# Patient Record
Sex: MALE | Race: WHITE | HISPANIC OR LATINO | Employment: OTHER | URBAN - METROPOLITAN AREA
[De-identification: names, ages, dates, MRNs, and addresses within clinical notes are randomized per-mention and may not be internally consistent; named-entity substitution may affect disease eponyms.]

---

## 2018-07-14 ENCOUNTER — ANESTHESIA EVENT (OUTPATIENT)
Dept: EMERGENCY MEDICINE | Facility: HOSPITAL | Age: 23
DRG: 917 | End: 2018-07-14
Payer: MEDICAID

## 2018-07-14 ENCOUNTER — ANESTHESIA (OUTPATIENT)
Dept: EMERGENCY MEDICINE | Facility: HOSPITAL | Age: 23
DRG: 917 | End: 2018-07-14
Payer: MEDICAID

## 2018-07-14 ENCOUNTER — HOSPITAL ENCOUNTER (INPATIENT)
Facility: HOSPITAL | Age: 23
LOS: 4 days | Discharge: HOME | DRG: 917 | End: 2018-07-18
Attending: EMERGENCY MEDICINE | Admitting: FAMILY MEDICINE
Payer: MEDICAID

## 2018-07-14 DIAGNOSIS — Z45.2 ENCOUNTER FOR CENTRAL LINE PLACEMENT: ICD-10-CM

## 2018-07-14 DIAGNOSIS — R57.0 CARDIOGENIC SHOCK: ICD-10-CM

## 2018-07-14 DIAGNOSIS — J96.02 ACUTE RESPIRATORY FAILURE WITH HYPOXIA AND HYPERCARBIA: Primary | ICD-10-CM

## 2018-07-14 DIAGNOSIS — F19.10 POLYSUBSTANCE ABUSE: ICD-10-CM

## 2018-07-14 DIAGNOSIS — N17.9 AKI (ACUTE KIDNEY INJURY): ICD-10-CM

## 2018-07-14 DIAGNOSIS — J96.01 ACUTE RESPIRATORY FAILURE WITH HYPOXIA AND HYPERCARBIA: Primary | ICD-10-CM

## 2018-07-14 DIAGNOSIS — J96.92 RESPIRATORY FAILURE WITH HYPOXIA AND HYPERCAPNIA, UNSPECIFIED CHRONICITY: ICD-10-CM

## 2018-07-14 DIAGNOSIS — R57.9 SHOCK: ICD-10-CM

## 2018-07-14 DIAGNOSIS — I95.9 HYPOTENSION, UNSPECIFIED HYPOTENSION TYPE: ICD-10-CM

## 2018-07-14 DIAGNOSIS — J96.91 RESPIRATORY FAILURE WITH HYPOXIA AND HYPERCAPNIA, UNSPECIFIED CHRONICITY: ICD-10-CM

## 2018-07-14 DIAGNOSIS — T50.902D INTENTIONAL DRUG OVERDOSE, SUBSEQUENT ENCOUNTER: ICD-10-CM

## 2018-07-14 DIAGNOSIS — R41.82 ALTERED MENTAL STATUS: ICD-10-CM

## 2018-07-14 DIAGNOSIS — J96.90 RESPIRATORY FAILURE: ICD-10-CM

## 2018-07-14 LAB
ALBUMIN SERPL BCP-MCNC: 2.9 G/DL
ALBUMIN SERPL BCP-MCNC: 4.2 G/DL
ALLENS TEST: ABNORMAL
ALP SERPL-CCNC: 102 U/L
ALP SERPL-CCNC: 61 U/L
ALT SERPL W/O P-5'-P-CCNC: 44 U/L
ALT SERPL W/O P-5'-P-CCNC: 51 U/L
AMMONIA PLAS-SCNC: 70 UMOL/L
AMORPH CRY URNS QL MICRO: ABNORMAL
AMPHET+METHAMPHET UR QL: NEGATIVE
ANION GAP SERPL CALC-SCNC: 17 MMOL/L
ANION GAP SERPL CALC-SCNC: 6 MMOL/L
APAP SERPL-MCNC: <3 UG/ML
APTT BLDCRRT: 27.4 SEC
AST SERPL-CCNC: 72 U/L
AST SERPL-CCNC: 80 U/L
BACTERIA #/AREA URNS HPF: ABNORMAL /HPF
BARBITURATES UR QL SCN>200 NG/ML: NEGATIVE
BASOPHILS # BLD AUTO: 0.01 K/UL
BASOPHILS # BLD AUTO: 0.01 K/UL
BASOPHILS NFR BLD: 0.1 %
BASOPHILS NFR BLD: 0.1 %
BENZODIAZ UR QL SCN>200 NG/ML: NEGATIVE
BILIRUB SERPL-MCNC: 1.1 MG/DL
BILIRUB SERPL-MCNC: 2 MG/DL
BILIRUB UR QL STRIP: NEGATIVE
BUN SERPL-MCNC: 18 MG/DL
BUN SERPL-MCNC: 19 MG/DL
BZE UR QL SCN: NEGATIVE
CALCIUM SERPL-MCNC: 7.5 MG/DL
CALCIUM SERPL-MCNC: 8.6 MG/DL
CANNABINOIDS UR QL SCN: NORMAL
CHLORIDE SERPL-SCNC: 110 MMOL/L
CHLORIDE SERPL-SCNC: 114 MMOL/L
CK SERPL-CCNC: 2314 U/L
CK SERPL-CCNC: 625 U/L
CLARITY UR: CLEAR
CO2 SERPL-SCNC: 18 MMOL/L
CO2 SERPL-SCNC: 22 MMOL/L
COLOR UR: YELLOW
CREAT SERPL-MCNC: 1.7 MG/DL
CREAT SERPL-MCNC: 2.2 MG/DL
CREAT UR-MCNC: 157 MG/DL
DELSYS: ABNORMAL
DIASTOLIC DYSFUNCTION: YES
DIFFERENTIAL METHOD: ABNORMAL
DIFFERENTIAL METHOD: ABNORMAL
EOSINOPHIL # BLD AUTO: 0 K/UL
EOSINOPHIL # BLD AUTO: 0 K/UL
EOSINOPHIL NFR BLD: 0 %
EOSINOPHIL NFR BLD: 0 %
ERYTHROCYTE [DISTWIDTH] IN BLOOD BY AUTOMATED COUNT: 12.9 %
ERYTHROCYTE [DISTWIDTH] IN BLOOD BY AUTOMATED COUNT: 13.1 %
ERYTHROCYTE [SEDIMENTATION RATE] IN BLOOD BY WESTERGREN METHOD: 16 MM/H
ERYTHROCYTE [SEDIMENTATION RATE] IN BLOOD BY WESTERGREN METHOD: 20 MM/H
ERYTHROCYTE [SEDIMENTATION RATE] IN BLOOD BY WESTERGREN METHOD: 20 MM/H
EST. GFR  (AFRICAN AMERICAN): 47 ML/MIN/1.73 M^2
EST. GFR  (AFRICAN AMERICAN): >60 ML/MIN/1.73 M^2
EST. GFR  (NON AFRICAN AMERICAN): 41 ML/MIN/1.73 M^2
EST. GFR  (NON AFRICAN AMERICAN): 56 ML/MIN/1.73 M^2
ESTIMATED AVG GLUCOSE: 100 MG/DL
ESTIMATED PA SYSTOLIC PRESSURE: 33.15
FIO2: 100
FIO2: 100
FIO2: 90
GLUCOSE SERPL-MCNC: 137 MG/DL
GLUCOSE SERPL-MCNC: 64 MG/DL
GLUCOSE UR QL STRIP: ABNORMAL
HBA1C MFR BLD HPLC: 5.1 %
HCO3 UR-SCNC: 20.2 MMOL/L (ref 24–28)
HCO3 UR-SCNC: 20.9 MMOL/L (ref 24–28)
HCO3 UR-SCNC: 21.7 MMOL/L (ref 24–28)
HCT VFR BLD AUTO: 48.2 %
HCT VFR BLD AUTO: 59 %
HGB BLD-MCNC: 15.8 G/DL
HGB BLD-MCNC: 19.4 G/DL
HGB UR QL STRIP: ABNORMAL
HYALINE CASTS #/AREA URNS LPF: 20 /LPF
INR PPP: 1.3
KETONES UR QL STRIP: NEGATIVE
LACTATE SERPL-SCNC: 3.1 MMOL/L
LACTATE SERPL-SCNC: 3.1 MMOL/L
LACTATE SERPL-SCNC: 8 MMOL/L
LEUKOCYTE ESTERASE UR QL STRIP: NEGATIVE
LYMPHOCYTES # BLD AUTO: 1 K/UL
LYMPHOCYTES # BLD AUTO: 1.4 K/UL
LYMPHOCYTES NFR BLD: 13.4 %
LYMPHOCYTES NFR BLD: 8.4 %
MAGNESIUM SERPL-MCNC: 1.5 MG/DL
MAGNESIUM SERPL-MCNC: 2.5 MG/DL
MCH RBC QN AUTO: 30.2 PG
MCH RBC QN AUTO: 30.3 PG
MCHC RBC AUTO-ENTMCNC: 32.8 G/DL
MCHC RBC AUTO-ENTMCNC: 32.9 G/DL
MCV RBC AUTO: 92 FL
MCV RBC AUTO: 93 FL
METHADONE UR QL SCN>300 NG/ML: NEGATIVE
MICROSCOPIC COMMENT: ABNORMAL
MIN VOL: 13
MITRAL VALVE MOBILITY: NORMAL
MODE: ABNORMAL
MONOCYTES # BLD AUTO: 0.7 K/UL
MONOCYTES # BLD AUTO: 1.1 K/UL
MONOCYTES NFR BLD: 6.2 %
MONOCYTES NFR BLD: 9 %
NEUTROPHILS # BLD AUTO: 10 K/UL
NEUTROPHILS # BLD AUTO: 8.5 K/UL
NEUTROPHILS NFR BLD: 80.1 %
NEUTROPHILS NFR BLD: 82 %
NITRITE UR QL STRIP: NEGATIVE
OPIATES UR QL SCN: NORMAL
PCO2 BLDA: 41.9 MMHG (ref 35–45)
PCO2 BLDA: 50.1 MMHG (ref 35–45)
PCO2 BLDA: 53.2 MMHG (ref 35–45)
PCP UR QL SCN>25 NG/ML: NEGATIVE
PEEP: 16
PEEP: 16
PEEP: 5
PH SMN: 7.19 [PH] (ref 7.35–7.45)
PH SMN: 7.23 [PH] (ref 7.35–7.45)
PH SMN: 7.32 [PH] (ref 7.35–7.45)
PH UR STRIP: 6 [PH] (ref 5–8)
PHOSPHATE SERPL-MCNC: 1.8 MG/DL
PIP: 20
PLATELET # BLD AUTO: 188 K/UL
PLATELET # BLD AUTO: 229 K/UL
PMV BLD AUTO: 10.2 FL
PMV BLD AUTO: 10.4 FL
PO2 BLDA: 21 MMHG (ref 40–60)
PO2 BLDA: 51 MMHG (ref 80–100)
PO2 BLDA: 57 MMHG (ref 80–100)
POC BE: -4 MMOL/L
POC BE: -7 MMOL/L
POC BE: -8 MMOL/L
POC SATURATED O2: 24 % (ref 95–100)
POC SATURATED O2: 83 % (ref 95–100)
POC SATURATED O2: 83 % (ref 95–100)
POC TCO2: 22 MMOL/L (ref 23–27)
POC TCO2: 22 MMOL/L (ref 24–29)
POC TCO2: 23 MMOL/L (ref 23–27)
POTASSIUM SERPL-SCNC: 3.8 MMOL/L
POTASSIUM SERPL-SCNC: 4.2 MMOL/L
PROCALCITONIN SERPL IA-MCNC: 12.87 NG/ML
PROT SERPL-MCNC: 4.7 G/DL
PROT SERPL-MCNC: 7.2 G/DL
PROT UR QL STRIP: ABNORMAL
PROTHROMBIN TIME: 13.4 SEC
RBC # BLD AUTO: 5.21 M/UL
RBC # BLD AUTO: 6.42 M/UL
RBC #/AREA URNS HPF: 10 /HPF (ref 0–4)
RETIRED EF AND QEF - SEE NOTES: 25 (ref 55–65)
SALICYLATES SERPL-MCNC: <5 MG/DL
SALICYLATES SERPL-MCNC: <5 MG/DL
SAMPLE: ABNORMAL
SITE: ABNORMAL
SODIUM SERPL-SCNC: 142 MMOL/L
SODIUM SERPL-SCNC: 145 MMOL/L
SP GR UR STRIP: >=1.03 (ref 1–1.03)
SP02: 100
TOXICOLOGY INFORMATION: NORMAL
TRICUSPID VALVE REGURGITATION: ABNORMAL
TROPONIN I SERPL DL<=0.01 NG/ML-MCNC: 0.25 NG/ML
TROPONIN I SERPL DL<=0.01 NG/ML-MCNC: 0.55 NG/ML
TROPONIN I SERPL DL<=0.01 NG/ML-MCNC: 1.41 NG/ML
URN SPEC COLLECT METH UR: ABNORMAL
UROBILINOGEN UR STRIP-ACNC: NEGATIVE EU/DL
VT: 380
VT: 380
VT: 490
WBC # BLD AUTO: 10.56 K/UL
WBC # BLD AUTO: 12.23 K/UL
WBC #/AREA URNS HPF: 5 /HPF (ref 0–5)
YEAST URNS QL MICRO: ABNORMAL

## 2018-07-14 PROCEDURE — 94002 VENT MGMT INPAT INIT DAY: CPT

## 2018-07-14 PROCEDURE — 80053 COMPREHEN METABOLIC PANEL: CPT | Mod: 91

## 2018-07-14 PROCEDURE — 5A1945Z RESPIRATORY VENTILATION, 24-96 CONSECUTIVE HOURS: ICD-10-PCS | Performed by: EMERGENCY MEDICINE

## 2018-07-14 PROCEDURE — 93005 ELECTROCARDIOGRAM TRACING: CPT

## 2018-07-14 PROCEDURE — 96367 TX/PROPH/DG ADDL SEQ IV INF: CPT

## 2018-07-14 PROCEDURE — 82140 ASSAY OF AMMONIA: CPT

## 2018-07-14 PROCEDURE — 36620 INSERTION CATHETER ARTERY: CPT | Mod: ,,, | Performed by: INTERNAL MEDICINE

## 2018-07-14 PROCEDURE — 25000003 PHARM REV CODE 250: Performed by: STUDENT IN AN ORGANIZED HEALTH CARE EDUCATION/TRAINING PROGRAM

## 2018-07-14 PROCEDURE — 0BH17EZ INSERTION OF ENDOTRACHEAL AIRWAY INTO TRACHEA, VIA NATURAL OR ARTIFICIAL OPENING: ICD-10-PCS | Performed by: EMERGENCY MEDICINE

## 2018-07-14 PROCEDURE — 87205 SMEAR GRAM STAIN: CPT

## 2018-07-14 PROCEDURE — 87070 CULTURE OTHR SPECIMN AEROBIC: CPT

## 2018-07-14 PROCEDURE — 25000003 PHARM REV CODE 250: Performed by: FAMILY MEDICINE

## 2018-07-14 PROCEDURE — 84100 ASSAY OF PHOSPHORUS: CPT

## 2018-07-14 PROCEDURE — 36415 COLL VENOUS BLD VENIPUNCTURE: CPT

## 2018-07-14 PROCEDURE — 82550 ASSAY OF CK (CPK): CPT | Mod: 91

## 2018-07-14 PROCEDURE — 80053 COMPREHEN METABOLIC PANEL: CPT

## 2018-07-14 PROCEDURE — 99291 CRITICAL CARE FIRST HOUR: CPT | Mod: 25

## 2018-07-14 PROCEDURE — 85610 PROTHROMBIN TIME: CPT

## 2018-07-14 PROCEDURE — 96365 THER/PROPH/DIAG IV INF INIT: CPT

## 2018-07-14 PROCEDURE — 83735 ASSAY OF MAGNESIUM: CPT | Mod: 91

## 2018-07-14 PROCEDURE — 96361 HYDRATE IV INFUSION ADD-ON: CPT | Mod: 59

## 2018-07-14 PROCEDURE — 37799 UNLISTED PX VASCULAR SURGERY: CPT

## 2018-07-14 PROCEDURE — 93306 TTE W/DOPPLER COMPLETE: CPT

## 2018-07-14 PROCEDURE — 84484 ASSAY OF TROPONIN QUANT: CPT | Mod: 91

## 2018-07-14 PROCEDURE — 82803 BLOOD GASES ANY COMBINATION: CPT

## 2018-07-14 PROCEDURE — C1751 CATH, INF, PER/CENT/MIDLINE: HCPCS | Performed by: EMERGENCY MEDICINE

## 2018-07-14 PROCEDURE — 80307 DRUG TEST PRSMV CHEM ANLYZR: CPT

## 2018-07-14 PROCEDURE — 82550 ASSAY OF CK (CPK): CPT

## 2018-07-14 PROCEDURE — 80329 ANALGESICS NON-OPIOID 1 OR 2: CPT

## 2018-07-14 PROCEDURE — 83605 ASSAY OF LACTIC ACID: CPT

## 2018-07-14 PROCEDURE — 84145 PROCALCITONIN (PCT): CPT

## 2018-07-14 PROCEDURE — 93306 TTE W/DOPPLER COMPLETE: CPT | Mod: 26,,, | Performed by: INTERNAL MEDICINE

## 2018-07-14 PROCEDURE — 25000003 PHARM REV CODE 250: Performed by: EMERGENCY MEDICINE

## 2018-07-14 PROCEDURE — 83605 ASSAY OF LACTIC ACID: CPT | Mod: 91

## 2018-07-14 PROCEDURE — 94761 N-INVAS EAR/PLS OXIMETRY MLT: CPT

## 2018-07-14 PROCEDURE — 63600175 PHARM REV CODE 636 W HCPCS: Performed by: EMERGENCY MEDICINE

## 2018-07-14 PROCEDURE — 87086 URINE CULTURE/COLONY COUNT: CPT

## 2018-07-14 PROCEDURE — 99900035 HC TECH TIME PER 15 MIN (STAT)

## 2018-07-14 PROCEDURE — 96375 TX/PRO/DX INJ NEW DRUG ADDON: CPT

## 2018-07-14 PROCEDURE — 87040 BLOOD CULTURE FOR BACTERIA: CPT

## 2018-07-14 PROCEDURE — 36556 INSERT NON-TUNNEL CV CATH: CPT | Performed by: ANESTHESIOLOGY

## 2018-07-14 PROCEDURE — 31500 INSERT EMERGENCY AIRWAY: CPT | Mod: 59

## 2018-07-14 PROCEDURE — 87106 FUNGI IDENTIFICATION YEAST: CPT

## 2018-07-14 PROCEDURE — 36600 WITHDRAWAL OF ARTERIAL BLOOD: CPT

## 2018-07-14 PROCEDURE — 85025 COMPLETE CBC W/AUTO DIFF WBC: CPT

## 2018-07-14 PROCEDURE — 99233 SBSQ HOSP IP/OBS HIGH 50: CPT | Mod: ,,, | Performed by: INTERNAL MEDICINE

## 2018-07-14 PROCEDURE — 63600175 PHARM REV CODE 636 W HCPCS: Performed by: STUDENT IN AN ORGANIZED HEALTH CARE EDUCATION/TRAINING PROGRAM

## 2018-07-14 PROCEDURE — 83036 HEMOGLOBIN GLYCOSYLATED A1C: CPT

## 2018-07-14 PROCEDURE — 63600175 PHARM REV CODE 636 W HCPCS: Performed by: FAMILY MEDICINE

## 2018-07-14 PROCEDURE — 85730 THROMBOPLASTIN TIME PARTIAL: CPT

## 2018-07-14 PROCEDURE — 83735 ASSAY OF MAGNESIUM: CPT

## 2018-07-14 PROCEDURE — 81000 URINALYSIS NONAUTO W/SCOPE: CPT | Mod: 59

## 2018-07-14 PROCEDURE — 27000221 HC OXYGEN, UP TO 24 HOURS

## 2018-07-14 PROCEDURE — 20000000 HC ICU ROOM

## 2018-07-14 RX ORDER — PROPOFOL 10 MG/ML
5 INJECTION, EMULSION INTRAVENOUS CONTINUOUS
Status: DISCONTINUED | OUTPATIENT
Start: 2018-07-14 | End: 2018-07-16

## 2018-07-14 RX ORDER — IBUPROFEN 200 MG
16 TABLET ORAL
Status: DISCONTINUED | OUTPATIENT
Start: 2018-07-14 | End: 2018-07-18 | Stop reason: HOSPADM

## 2018-07-14 RX ORDER — IBUPROFEN 200 MG
24 TABLET ORAL
Status: DISCONTINUED | OUTPATIENT
Start: 2018-07-14 | End: 2018-07-18 | Stop reason: HOSPADM

## 2018-07-14 RX ORDER — MAGNESIUM SULFATE HEPTAHYDRATE 40 MG/ML
2 INJECTION, SOLUTION INTRAVENOUS ONCE
Status: DISCONTINUED | OUTPATIENT
Start: 2018-07-14 | End: 2018-07-14

## 2018-07-14 RX ORDER — SODIUM CHLORIDE 9 MG/ML
INJECTION, SOLUTION INTRAVENOUS CONTINUOUS
Status: DISCONTINUED | OUTPATIENT
Start: 2018-07-14 | End: 2018-07-14

## 2018-07-14 RX ORDER — PHENYLEPHRINE HYDROCHLORIDE 10 MG/ML
200 INJECTION INTRAVENOUS ONCE
Status: COMPLETED | OUTPATIENT
Start: 2018-07-14 | End: 2018-07-14

## 2018-07-14 RX ORDER — FENTANYL CITRAT/DEXTROSE 5%/PF 100 MCG/10
PATIENT CONTROLLED ANALGESIA SYRINGE INTRAVENOUS CONTINUOUS
Status: DISPENSED | OUTPATIENT
Start: 2018-07-14 | End: 2018-07-15

## 2018-07-14 RX ORDER — SUCCINYLCHOLINE CHLORIDE 20 MG/ML
INJECTION INTRAMUSCULAR; INTRAVENOUS CODE/TRAUMA/SEDATION MEDICATION
Status: COMPLETED | OUTPATIENT
Start: 2018-07-14 | End: 2018-07-14

## 2018-07-14 RX ORDER — DEXTROSE 50 % IN WATER (D50W) INTRAVENOUS SYRINGE
25
Status: DISCONTINUED | OUTPATIENT
Start: 2018-07-14 | End: 2018-07-14

## 2018-07-14 RX ORDER — FENTANYL CITRAT/DEXTROSE 5%/PF 100 MCG/10
PATIENT CONTROLLED ANALGESIA SYRINGE INTRAVENOUS CONTINUOUS
Status: DISCONTINUED | OUTPATIENT
Start: 2018-07-14 | End: 2018-07-14

## 2018-07-14 RX ORDER — SODIUM CHLORIDE 0.9 % (FLUSH) 0.9 %
5 SYRINGE (ML) INJECTION
Status: DISCONTINUED | OUTPATIENT
Start: 2018-07-14 | End: 2018-07-18 | Stop reason: HOSPADM

## 2018-07-14 RX ORDER — GLUCAGON 1 MG
1 KIT INJECTION
Status: DISCONTINUED | OUTPATIENT
Start: 2018-07-14 | End: 2018-07-18 | Stop reason: HOSPADM

## 2018-07-14 RX ORDER — NOREPINEPHRINE BITARTRATE/D5W 16MG/250ML
0.02 PLASTIC BAG, INJECTION (ML) INTRAVENOUS CONTINUOUS
Status: DISCONTINUED | OUTPATIENT
Start: 2018-07-14 | End: 2018-07-17

## 2018-07-14 RX ORDER — ETOMIDATE 2 MG/ML
INJECTION INTRAVENOUS CODE/TRAUMA/SEDATION MEDICATION
Status: COMPLETED | OUTPATIENT
Start: 2018-07-14 | End: 2018-07-14

## 2018-07-14 RX ADMIN — SODIUM CHLORIDE: 0.9 INJECTION, SOLUTION INTRAVENOUS at 10:07

## 2018-07-14 RX ADMIN — PROPOFOL 5 MCG/KG/MIN: 10 INJECTION, EMULSION INTRAVENOUS at 12:07

## 2018-07-14 RX ADMIN — SODIUM CHLORIDE: 0.9 INJECTION, SOLUTION INTRAVENOUS at 01:07

## 2018-07-14 RX ADMIN — MAGNESIUM SULFATE HEPTAHYDRATE: 500 INJECTION, SOLUTION INTRAMUSCULAR; INTRAVENOUS at 04:07

## 2018-07-14 RX ADMIN — CISATRACURIUM BESYLATE 1 MCG/KG/MIN: 10 INJECTION INTRAVENOUS at 05:07

## 2018-07-14 RX ADMIN — MINERAL OIL AND WHITE PETROLATUM: 150; 830 OINTMENT OPHTHALMIC at 09:07

## 2018-07-14 RX ADMIN — PROPOFOL 50 MCG/KG/MIN: 10 INJECTION, EMULSION INTRAVENOUS at 06:07

## 2018-07-14 RX ADMIN — Medication 300 MCG/HR: at 10:07

## 2018-07-14 RX ADMIN — Medication 0.82 MCG/KG/MIN: at 05:07

## 2018-07-14 RX ADMIN — ETOMIDATE 20 MG: 2 INJECTION, SOLUTION INTRAVENOUS at 07:07

## 2018-07-14 RX ADMIN — DEXTROSE MONOHYDRATE 25 G: 500 INJECTION PARENTERAL at 08:07

## 2018-07-14 RX ADMIN — PHENYLEPHRINE HYDROCHLORIDE 200 MCG: 10 INJECTION INTRAVENOUS at 09:07

## 2018-07-14 RX ADMIN — FENTANYL CITRATE: 50 INJECTION, SOLUTION INTRAMUSCULAR; INTRAVENOUS at 08:07

## 2018-07-14 RX ADMIN — MIDAZOLAM 2 MG/HR: 5 INJECTION INTRAMUSCULAR; INTRAVENOUS at 08:07

## 2018-07-14 RX ADMIN — SUCCINYLCHOLINE CHLORIDE 100 MG: 20 INJECTION, SOLUTION INTRAMUSCULAR; INTRAVENOUS at 07:07

## 2018-07-14 RX ADMIN — HYDROCORTISONE SODIUM SUCCINATE 100 MG: 100 INJECTION, POWDER, FOR SOLUTION INTRAMUSCULAR; INTRAVENOUS at 09:07

## 2018-07-14 RX ADMIN — POTASSIUM PHOSPHATE, MONOBASIC AND POTASSIUM PHOSPHATE, DIBASIC 20 MMOL: 224; 236 INJECTION, SOLUTION, CONCENTRATE INTRAVENOUS at 04:07

## 2018-07-14 RX ADMIN — VASOPRESSIN 0.04 UNITS/MIN: 20 INJECTION INTRAVENOUS at 12:07

## 2018-07-14 RX ADMIN — SODIUM CHLORIDE, SODIUM LACTATE, POTASSIUM CHLORIDE, AND CALCIUM CHLORIDE 1000 ML: .6; .31; .03; .02 INJECTION, SOLUTION INTRAVENOUS at 10:07

## 2018-07-14 RX ADMIN — Medication 0.8 MCG/KG/MIN: at 11:07

## 2018-07-14 RX ADMIN — PIPERACILLIN AND TAZOBACTAM 4.5 G: 4; .5 INJECTION, POWDER, LYOPHILIZED, FOR SOLUTION INTRAVENOUS; PARENTERAL at 08:07

## 2018-07-14 RX ADMIN — Medication 0.02 MCG/KG/MIN: at 10:07

## 2018-07-14 RX ADMIN — VANCOMYCIN HYDROCHLORIDE 2000 MG: 10 INJECTION, POWDER, LYOPHILIZED, FOR SOLUTION INTRAVENOUS at 10:07

## 2018-07-14 RX ADMIN — VASOPRESSIN 0.04 UNITS/MIN: 20 INJECTION INTRAVENOUS at 05:07

## 2018-07-14 RX ADMIN — SODIUM CHLORIDE 1000 ML: 0.9 INJECTION, SOLUTION INTRAVENOUS at 08:07

## 2018-07-14 RX ADMIN — SODIUM CHLORIDE 1000 ML: 0.9 INJECTION, SOLUTION INTRAVENOUS at 07:07

## 2018-07-14 NOTE — ED NOTES
Javi Retana MD at bedside for [facility], supervisor Dr Yao.  Emergency consent signed by Dr Yao and Dr Lindsey

## 2018-07-14 NOTE — CONSULTS
[facility]               Consult H&P    Reason for Consult:        Assessment:    1. Shock, Cardiogenic  2. NSTEMI demand perfusion mismatch  3. Respiratory Failure- on ventilator      Plan:    Continue Levophed  Tachycardia secondary to hypotension and levo  Cardiomyopathy stress induce. Will require bb and acei when more stable.        Cyn Lord MD  Cardiology Service      HPI: 24 y/o male with no significant PMH present non responsive. Unable to get history as patient is intubated to protect his airways. As per medical records patient snort ectasy and inhale a cleaning substance. He had tachycardia on presentation with hypotension. Cardiology consulted as troponin is elevated. Echo reviewed at bedside showed ef 25% with elevated right side pressures.       Review of Systems -Unable to Obtain    No past surgical history on file.    No past medical history on file.     reports that he has been smoking.  He has never used smokeless tobacco. He reports that he uses drugs, including Marijuana.     No family history on file.       Review of patient's allergies indicates:   Allergen Reactions    Penicillins Rash       There are no discharge medications for this patient.       dextrose 50 % in water (D50W)  25 g Intravenous ED 1 Time        sodium chloride 0.9%      fentanyl      midazolam (VERSED) infusion (non-titrating) 2 mg/hr (07/14/18 0808)    norepinephrine bitartrate-D5W 0.12 mcg/kg/min (07/14/18 1113)    propofol      vasopressin (PITRESSIN) infusion         dextrose 50%, dextrose 50%, dextrose 50%, glucagon (human recombinant), glucose, glucose, sodium chloride 0.9%    Vitals:    07/14/18 1041 07/14/18 1046 07/14/18 1101 07/14/18 1134   BP: (!) 74/45 (!) 75/40 (!) 78/46    BP Location:   Left arm    Patient Position:   Lying    Pulse: 110 110 103    Resp: (!) 22 (!) 23 (!) 23    SpO2: (!) 90% (!) 89% 95% (!) 70%   Weight:       Height:             Physical  Examination:  General Appearance: Intubated  Mental: Intubated and Ventilated  HEENT: Perrl  Chest: No pain with palpitations, no chest deformities  Heart: tachycardia, no murmurs, no gallops or rubs  Lung: CTAB  ABDOMEN: Soft, nontender, nondistended with good bowel sounds heard. No mass or hepatosplenomegaly  EXTREMITIES: Without cyanosis, clubbing or edema.   NEUROLOGICAL: Gross nonfocal. Skin: Warm and dry without any rash. There is no costovertebral angle tenderness.   Skin: no rashes lesions or ulcers      Lab Results   Component Value Date     07/14/2018    K 3.8 07/14/2018     07/14/2018    CO2 18 (L) 07/14/2018    BUN 18 07/14/2018    CREATININE 2.2 (H) 07/14/2018    GLU 64 (L) 07/14/2018    MG 2.5 07/14/2018    AST 72 (H) 07/14/2018    ALT 51 (H) 07/14/2018    ALBUMIN 4.2 07/14/2018    PROT 7.2 07/14/2018    BILITOT 1.1 (H) 07/14/2018    WBC 10.56 07/14/2018    HGB 15.8 07/14/2018    HCT 48.2 07/14/2018    MCV 93 07/14/2018     07/14/2018         Recent Labs  Lab 07/14/18  0738   *   TROPONINI 0.249*         Recent Labs  Lab 07/14/18  0738   *   TROPONINI 0.249*       No results found for: TSH    No results found for: HGBA1C      Images and labs reviewed    ECG: Sinus tachycardia

## 2018-07-14 NOTE — ED TRIAGE NOTES
Pt arrived via  EMS from home in [place].  Pt found unresponsive, moaning, foaming at the mouth.  Family reports pt snorted ecstacy for the fist time last per friends.  Mother initially heard him making moaning sounds around 0300 but didn't think anything of it because GF was in room with him.  EMS administered 1mg of Narcan with minimal response, NPA placed in field, bagged upon arrival.  Pt posturing upon arrival severely tachypneic unable to protect own airway.

## 2018-07-14 NOTE — LETTER
July 15, 2018                         [address] [place] [address] [place]a SILVA [address]  Phone: [phone]  Fax: [phone]   July 15, 2018     Patient:    Date of Birth:    Date of Visit: 7/15/2018       To Whom it May Concern:    Katie Botello was visiting a patient in [facility] ICU on [place] 7/15.      Hailey Miranda RN

## 2018-07-14 NOTE — H&P
[facility]  History & Physical     Subjective:       Chief Complaint/Reason for Admission: Drug Overdose    History of Present Illness:   Patient is a 23 y.o. male admitted after a toxic ingestion. The patient was intubated upon exam and unable to provide any information.  Per review of medical record and EMS, the patient has no significant pmhx, medications, or allergies.   Drugs thought to be ingested include: possible ecstasy and urine tox screen was positive for opioids and THC. Approximately last seen at 12 am without being altered. In the ED, the patient was treated with Narcan with little improvement. Upon presentation in the ED, the patient was unresponsive and unable to protect his airway; therefore, the patient was intubated.      Review of Systems:  Unable to perform secondary to the patient clinical condition. The patient was intubated during the encounter.     Patient Active Problem List    Diagnosis Date Noted    Respiratory failure 07/14/2018     No past medical history on file.   No past surgical history on file.   No prescriptions prior to admission.     Review of patient's allergies indicates:   Allergen Reactions    Penicillins Rash      Social History   Substance Use Topics    Smoking status: Current Every Day Smoker    Smokeless tobacco: Never Used    Alcohol use Not on file      No family history on file.       OBJECTIVE:     Vital signs in last 24 hours:  Pulse:  [] 103  Resp:  [19-38] 23  SpO2:  [70 %-100 %] 70 %  BP: ()/() 78/46    Constitutional: He appears well-developed.    General: The patient is intubated.  HENT:   Neck: supple w/o JVD  Head: Normocephalic and atraumatic.   Eyes: Pupils were symmetric and approximately 6 mm in size.   Neck: No traumatic lesions noted.   Cardiovascular: Sinus rhythm.  Exam reveals no gallop, murmurs, and no friction rub. +Tachycardia.  Pulmonary/Chest: Mechanical breath sounds.  Abdominal: Soft. He exhibits no  distension.    Musculoskeletal: unable to access adequately secondary to his current clinical condition.   Neurological: Sedated  Skin: Skin is warm and dry. No rashes noted.     Data Review:   CBC:   Lab Results   Component Value Date    WBC 12.23 07/14/2018    RBC 6.42 (H) 07/14/2018    HGB 19.4 (H) 07/14/2018    HCT 59.0 (H) 07/14/2018     07/14/2018     BMP:   Lab Results   Component Value Date    GLU 64 (L) 07/14/2018     07/14/2018    K 3.8 07/14/2018     07/14/2018    CO2 18 (L) 07/14/2018    BUN 18 07/14/2018    CREATININE 2.2 (H) 07/14/2018    CALCIUM 8.6 (L) 07/14/2018     Coagulation: No results found for: PT, INR, APTT  Cardiac markers: No results found for: CKMB, TROPONINT, MYOGLOBIN  ABGs:   Lab Results   Component Value Date    PH 7.229 (LL) 07/14/2018    PO2 57 (LL) 07/14/2018    PCO2 50.1 (H) 07/14/2018     Radiology review:   X-Ray Chest 1 View  New right internal jugular central venous catheter tip overlies the SVC.  No pneumothorax.    CT Head Without Contrast  No acute abnormality.    X-Ray Chest AP Portable  Diffuse hazy airspace disease    ASSESSMENT/PLAN:     Active Hospital Problems    Diagnosis  POA    Respiratory failure [J96.90]  Yes      Resolved Hospital Problems    Diagnosis Date Resolved POA   No resolved problems to display.     Mr. Eliu Herrera w/ no significant PMHx presented to the ED in respiratory failure secondary to possible drug overdose.    1. Respiratory failure  - Intubated in the ED  - Consulted Pulmonary/Critical Care and appreciate recommendations  - Cardiology Consulted and appreciate recommendations  - Continue to monitor ABG's  - F/u blood cxs/ urine cxs, placed on broad spectrum abxs: Vanco and Zosyn  - Neuro checks q 4 hours  - Continue Pulse Ox    2. Hypotension likely secondary to Cardiogenic Shock  - Landers in place to monitor I/O, Landers to gravity  - Currently on Norepi (Pressor)  - Continue IVF's   - Continue telemetry  - Keep MAP >65 mm  Hg  - Avoid renal toxic meds  - Keep Mg 2, K 4  - LA 8  - Per Cardiology, will require a BB and ACEI when more stable     NEURO-PSYCH:     -Drug panel positive for opioids and THC      CV     -Maintain MAP>65   -Currently on Norepi     PULM:     -Vanco and Zosyn started.   -Monitor Pulse Ox q 4   -Intubated and Sedated     RENAL:     -IVFs   -Strict I&Os  -Avoid renal toxic meds    Endo:  -SSI    FEN-GI:     -NPO  -IV fluids    HEME:      -No acute issues at this time     ID:  -Vanc and zosyn started   -Urine culture and UA ordered  -Blood culture ordered  -Urine culture ordered  -Procalc  12.87    PPX  -SCD    Dispo:  - Admit to ICU  - Full Code status  - Follow up on both Cardiology and Pulmonary/Critical Care recommendations  - Continue supportive and pain management  - Case discussed with the team

## 2018-07-14 NOTE — CONSULTS
Pulmonary & Critical Care Medicine   Consultation Note    Reason for Consultation: Vent management, hypoxia    HPI: Mr. Herrera is a 24 yo  male who presented to [facility] on 7/14 early in the morning for altered mental status at home. Mother and sister state that patient went to bed around 2 am and was heard moaning at 6 am this morning. Mother went into the room and the patient was not feeling well and was confused. Known marijuana use, however, no other known drug use. Patient is very involved with his family and is not known to do any other drugs except marijuana. Apparently, patient's girlfriend stated that he tried ecstasy for the first time last night. Utox was (+) for opiates and marijuana. Per family, no recent sick contacts, no recent illnesses, no recent complaints of any sort.    In the ED, patient received 2L IVF and antibiotics. Central line was placed. Levophed was started.    Since arriving to the ICU, 1 more L of fluid was administered. Arterial line was placed. Vasopressin was added to Levophed. Hydrocortisone 100 mg q8h was started. Patient has been very difficult to oxygenate - now in severe ARDS. PEEP at 16 and FiO2 of 100% to maintain sats around 88%. Bedside ECHO showed dilated IVC - however, patient is on high PEEP. LV function seemed diminished. Waiting on official ECHO report.    No past medical history on file.     No past surgical history on file.     Social History:   Social History     Social History    Marital status: Single     Spouse name: N/A    Number of children: N/A    Years of education: N/A     Occupational History    Not on file.     Social History Main Topics    Smoking status: Current Every Day Smoker    Smokeless tobacco: Never Used    Alcohol use Not on file    Drug use: Yes     Types: Marijuana    Sexual activity: Not on file     Other Topics Concern    Not on file     Social History Narrative    No narrative on file     No family history on  file.  Drug Allergies:   Review of patient's allergies indicates:   Allergen Reactions    Penicillins Rash     Current Infusions:   sodium chloride 0.9%      fentanyl      midazolam (VERSED) infusion (non-titrating) 2 mg/hr (07/14/18 0808)    norepinephrine bitartrate-D5W 0.12 mcg/kg/min (07/14/18 1113)    propofol      vasopressin (PITRESSIN) infusion       Scheduled Medications:     dextrose 50 % in water (D50W)  25 g Intravenous ED 1 Time     PRN Medications:   dextrose 50%, dextrose 50%, dextrose 50%, glucagon (human recombinant), glucose, glucose, sodium chloride 0.9%    Review of Systems:   A comprehensive 12-point review of systems was performed, and is negative except for those items mentioned above in the HPI section of this note.     Vital Signs:    Vitals:    07/14/18 1101   BP: (!) 78/46   Pulse: 103   Resp: (!) 23     Fluid Balance:     Intake/Output Summary (Last 24 hours) at 07/14/18 1224  Last data filed at 07/14/18 0919   Gross per 24 hour   Intake             2100 ml   Output                0 ml   Net             2100 ml     Physical Exam:   General: NAD, sedated  HEENT: AT/NC, PERRL, nasal and oral mucosa dry. Normal dentition. Oropharynx without exudate.   Neck: Supple without JVD. Trachea midline. Thyroid feels normal.  Cardiac: S1S2 auscultated, RRR with no MRG with brisk cap refill and symmetric pulses in distal extremities.  Respiratory: Normal inspection. Symmetric chest rise. Auscultation clear bilaterally. No increased work of breathing noted.   Abdomen: Soft, NT/ND. +BS. No palpable masses. No hepatosplenomegaly.   Extremities: No visible atrophy. No clubbing or cyanosis on nail exam. No edema.  Skin: Warm and dry without visible rash.  Neuro: sedated    Personal Review and Summary of Prior Diagnostics    Laboratory Studies:     Recent Labs  Lab 07/14/18  0835   PH 7.229*   PCO2 50.1*   PO2 57*   HCO3 20.9*   POCSATURATED 83*   BE -7       Recent Labs  Lab 07/14/18  1154   WBC  10.56   RBC 5.21   HGB 15.8   HCT 48.2      MCV 93   MCH 30.3   MCHC 32.8       Recent Labs  Lab 07/14/18  0738      K 3.8      CO2 18*   BUN 18   CREATININE 2.2*   MG 2.5     Microbiology Data:   Microbiology Results (last 7 days)     Procedure Component Value Units Date/Time    Blood culture x two cultures. Draw prior to antibiotics. [[id number]] Collected:  07/14/18 0738    Order Status:  Sent Specimen:  Blood from Peripheral, Forearm, Right Updated:  07/14/18 0914    Urine culture - High Risk [[id number]] Collected:  07/14/18 0757    Order Status:  Sent Specimen:  Urine from Urine, Catheterized Updated:  07/14/18 0911    Blood culture x two cultures. Draw prior to antibiotics. [[id number]] Collected:  07/14/18 0847    Order Status:  Sent Specimen:  Blood from Peripheral, Forearm, Left Updated:  07/14/18 0849        Summary of Chest Imaging Personally Reviewed:   Bilateral airspace disease    2D Echo: Pending results    PFT's: None on file    Impression & Recommendations    Neuro:  -Sedated with Fentanyl and Propofol at this time.  -Will be paralyzed with Nimbex.    Pulm:  -ARDS  -7.323/42/51/21/83% on 380/30/16/100%  -Nimbex will be started once sedation is adequate.  -Maintain O2 sats >88% or paO2 >55.  -Permissive hypercapnia is fine.    Cardio:  -Shock likely 2/2 to sepsis? Cardiomyopathy 2/2 to drug effects?  -On Levophed and Vasopressin  -Hydrocortisone 100 mg IV q8h started    Renal:  -BUN/Cr improved with 3L fluid resuscitation.  -GFR now 56  -Concern for ATN in the near future given shock.  -CK 2314 at this time.  -Recheck CK tomorrow morning.  -Do not start maintenance fluid.    ID:  -Afebrile, WBC 10 with neutrophilia  -Lactate 8.0 --> 3.1  -Procal 12.87 (in the setting of renal dysfunction)  -UA clean  -Blood cultures obtained.  -Respiratory culture pending.  -Receiving Vanc and Zosyn    Thank you for involving us in the care of this patient. We will continue to follow along. Please  call with any questions.    Jennifer Ba MD  [facility] Center[facility]imm[facility]wood[facility] Fellow, PGY 5  [facility]  Pager: [phone]

## 2018-07-14 NOTE — ANESTHESIA PROCEDURE NOTES
Central Line    Diagnosis: Hypotension  Patient location during procedure: ED  Procedure start time: 7/14/2018 9:37 AM  Timeout: 7/14/2018 9:37 AM  Procedure end time: 7/14/2018 9:55 AM  Staffing  Anesthesiologist: SARAH HUTTON  Resident/CRNA: SAMIR NATH  Performed: resident/CRNA and anesthesiologist   Anesthesiologist was present at the time of the procedure.  Preanesthetic Checklist  Completed: patient identified, site marked, surgical consent, pre-op evaluation, timeout performed, IV checked, risks and benefits discussed, monitors and equipment checked and anesthesia consent given  Indication  Indication: vascular access, med administration     Anesthesia   Anesthesia method: deferred given mental status.    Central Line  Skin Prep: skin prepped with ChloraPrep, skin prep agent completely dried prior to procedure  maximum sterile barriers used during central venous catheter insertion  hand hygiene performed prior to central venous catheter insertion  Location: right internal jugular,   Catheter type: triple lumen  Catheter Size: 7 Fr  Inserted Catheter Length: 16 cm  Ultrasound: vascular probe with ultrasound  Vessel Caliber: medium, patent  Vascular Doppler:  not done, compressibility normal  Needle advanced into vessel with real time Ultrasound guidance.  Sterile sheath used.  Image recorded and saved.  Manometry: none  Insertion Attempts: 1   Securement:line sutured, chlorhexidine patch, sterile dressing applied and blood return through all ports     Post-Procedure  Post-procedure assessment: pending.  Adverse Events:none

## 2018-07-14 NOTE — ED PROVIDER NOTES
Encounter Date: 7/14/2018    SCRIBE #1 NOTE: I, Cherise Montgomery, am scribing for, and in the presence of,  Dr. Lindsey. I have scribed the entire note.       History     Chief Complaint   Patient presents with    Drug Overdose     Time seen by the provider: 7:20 AM    This is a 23 y.o. male who has no past medical history on file.   The patient presents to the Emergency Department via EMS s/p possible OD PTA.   Per EMS, his last known well time was about 7 hours ago. Family reports they found him at home unresponsive and grunting.   EMS administered 1 mg Narcan IV, and he became more responsive, but only with BUE flexor response.   On arrival to ER, the pt was unresponsive and unable to contribute to history.    Per EMS, he has no history, medications, or allergies.  Pt has no past surgical history on file.        The history is provided by the EMS personnel. The history is limited by the condition of the patient.     Review of patient's allergies indicates:  Allergies not on file  No past medical history on file.  No past surgical history on file.  No family history on file.  Social History   Substance Use Topics    Smoking status: Current Every Day Smoker    Smokeless tobacco: Never Used    Alcohol use Not on file     Review of Systems   Unable to perform ROS: Patient unresponsive       Physical Exam     Initial Vitals   BP Pulse Resp Temp SpO2   07/14/18 0735 07/14/18 0729 07/14/18 0729 07/14/18 1545 07/14/18 0729   121/62 (!) 180 (!) 38 98.7 °F (37.1 °C) 97 %      MAP       --                Physical Exam    Nursing note and vitals reviewed.  Constitutional: He appears well-developed and well-nourished. He is diaphoretic. He appears ill.   Unresponsive. Not following commands.   HENT:   Head: Normocephalic and atraumatic.   Nose: Nose normal.   Mouth/Throat: No oropharyngeal exudate.   Eyes: Conjunctivae and lids are normal. Pupils are equal, round, and reactive to light.   Pupils 5 mm and symmetric.   Neck:  Normal range of motion. No JVD present.   Cardiovascular: Regular rhythm. Tachycardia present.  Exam reveals no gallop and no friction rub.    No murmur heard.  HR 160s   Pulmonary/Chest: Tachypnea noted. He is in respiratory distress.   Abdominal: Soft. He exhibits no distension. There is no tenderness.   Musculoskeletal: Normal range of motion. He exhibits no edema.   No obvious deformity.   Neurological: He is unresponsive. He exhibits abnormal muscle tone. GCS eye subscore is 1. GCS verbal subscore is 2. GCS motor subscore is 3.   Bilateral upper extremity contractures.  Unable to obtain full neuro exam.   Skin: Skin is warm. No rash noted. No erythema.         ED Course   Intubation  Date/Time: 7/14/2018 7:35 AM  Location procedure was performed: [facility] EMERGENCY DEPARTMENT  Performed by: VIVEK ARRIAGA  Authorized by: VIVEK ARRIAGA   Consent Done: Emergent Situation  Indications: respiratory failure and  airway protection  Intubation method: direct  Patient status: paralyzed (RSI)  Preoxygenation: BVM  Sedatives: etomidate  Paralytic: succinylcholine  Laryngoscope size: Mac 4  Tube size: 8.0 mm  Tube type: cuffed  Number of attempts: 1  Cricoid pressure: yes  Cords visualized: yes  Post-procedure assessment: chest rise and CO2 detector  Breath sounds: equal  Cuff inflated: yes  Tube secured with: ETT murillo  Chest x-ray findings: endotracheal tube in appropriate position  Patient tolerance: Patient tolerated the procedure well with no immediate complications  Complications: No        Labs Reviewed   CBC W/ AUTO DIFFERENTIAL - Abnormal; Notable for the following:        Result Value    RBC 6.42 (*)     Hemoglobin 19.4 (*)     Hematocrit 59.0 (*)     Gran # (ANC) 10.0 (*)     Mono # 1.1 (*)     Gran% 82.0 (*)     Lymph% 8.4 (*)     All other components within normal limits   COMPREHENSIVE METABOLIC PANEL - Abnormal; Notable for the following:     CO2 18 (*)     Glucose 64 (*)     Creatinine 2.2 (*)     Calcium  8.6 (*)     Total Bilirubin 1.1 (*)     AST 72 (*)     ALT 51 (*)     Anion Gap 17 (*)     eGFR if  47 (*)     eGFR if non  41 (*)     All other components within normal limits   LACTIC ACID, PLASMA - Abnormal; Notable for the following:     Lactate (Lactic Acid) 8.0 (*)     All other components within normal limits    Narrative:        LA critical result(s) called and verbal readback obtained from   KELVIN Snyder, 07/14/2018 08:27   URINALYSIS, REFLEX TO URINE CULTURE - Abnormal; Notable for the following:     Specific Gravity, UA >=1.030 (*)     Protein, UA 1+ (*)     Glucose, UA 3+ (*)     Occult Blood UA 3+ (*)     All other components within normal limits    Narrative:     Preferred Collection Type->Urine, Clean Catch   TROPONIN I - Abnormal; Notable for the following:     Troponin I 0.249 (*)     All other components within normal limits   PROCALCITONIN - Abnormal; Notable for the following:     Procalcitonin 12.87 (*)     All other components within normal limits   ACETAMINOPHEN LEVEL - Abnormal; Notable for the following:     Acetaminophen (Tylenol), Serum <3.0 (*)     All other components within normal limits   SALICYLATE LEVEL - Abnormal; Notable for the following:     Salicylate Lvl <5.0 (*)     All other components within normal limits   AMMONIA - Abnormal; Notable for the following:     Ammonia 70 (*)     All other components within normal limits   CK - Abnormal; Notable for the following:      (*)     All other components within normal limits   URINALYSIS MICROSCOPIC - Abnormal; Notable for the following:     RBC, UA 10 (*)     Hyaline Casts, UA 20 (*)     All other components within normal limits    Narrative:     Preferred Collection Type->Urine, Clean Catch   ISTAT PROCEDURE - Abnormal; Notable for the following:     POC PH 7.229 (*)     POC PCO2 50.1 (*)     POC PO2 57 (*)     POC HCO3 20.9 (*)     POC SATURATED O2 83 (*)     POC TCO2 22 (*)     All other  components within normal limits   CULTURE, URINE   MAGNESIUM   DRUG SCREEN PANEL, URINE EMERGENCY    Narrative:     Preferred Collection Type->Urine, Clean Catch   CK     EKG Readings: (Independently Interpreted)   8:59 AM: Sinus tachycardia. Rate 121. Nonspecifc ST changes. No ischemia. Normal intervals. No prior EKG for comparison.       Imaging Results          X-Ray Chest 1 View (Final result)  Result time 07/14/18 10:10:41    Final result by Jg Gallegos MD (07/14/18 10:10:41)                 Impression:      New right internal jugular central venous catheter tip overlies the SVC.  No pneumothorax.    Unchanged to slightly worsened diffuse airspace disease.      Electronically signed by: Jg Gallegos MD  Date:    07/14/2018  Time:    10:10             Narrative:    EXAMINATION:  XR CHEST 1 VIEW    CLINICAL HISTORY:  Encounter for adjustment and management of vascular access device    TECHNIQUE:  Single frontal portable view of the chest was performed.    COMPARISON:  Chest radiograph same date 7:43 a.m.    FINDINGS:  Support devices: Defibrillator pad overlies the mid chest with another overlying the left inferior hemithorax.  Right internal jugular central venous catheter tip overlies the SVC.  Endotracheal tube tip overlies the mid to lower thoracic trachea.    Diffuse patchy and hazy airspace opacification throughout the lungs, worse on the left is unchanged to minimally worsened from examination earlier same date.  No pleural effusion or pneumothorax.    The cardiac silhouette is normal in size. The hilar and mediastinal contours are unremarkable.    Bones are intact.                               CT Head Without Contrast (Final result)  Result time 07/14/18 09:31:56    Final result by Jg Gallegos MD (07/14/18 09:31:56)                 Impression:      No acute abnormality.      Electronically signed by: Jg Gallegos MD  Date:    07/14/2018  Time:    09:31             Narrative:    EXAMINATION:  CT HEAD  WITHOUT CONTRAST    CLINICAL HISTORY:  altered mental status;    TECHNIQUE:  Low dose axial CT images obtained throughout the head without intravenous contrast. Sagittal and coronal reconstructions were performed.    COMPARISON:  None.    FINDINGS:  Intracranial compartment:    The brain parenchyma appears normal. No parenchymal mass, hemorrhage, edema or major vascular distribution infarct.    Ventricles and sulci are normal in size for age without evidence of hydrocephalus.    No extra-axial blood or fluid collections.    Skull/extracranial contents (limited evaluation): No fracture. Mastoid air cells and paranasal sinuses are essentially clear.  Endotracheal tube is partially imaged in the oral cavity.                               X-Ray Chest AP Portable (Final result)  Result time 07/14/18 08:11:21    Final result by Jg Gallegos MD (07/14/18 08:11:21)                 Impression:      Diffuse hazy airspace disease, in keeping with ARDS given clinical setting of sepsis.      Electronically signed by: Jg Gallegos MD  Date:    07/14/2018  Time:    08:11             Narrative:    EXAMINATION:  XR CHEST AP PORTABLE    CLINICAL HISTORY:  Sepsis;    TECHNIQUE:  Single frontal portable view of the chest was performed.    COMPARISON:  None    FINDINGS:  Support devices: Endotracheal tube tip is at the level of the lower thoracic trachea, 1.5 cm above the nicole.    Diffuse patchy and hazy airspace opacification is present throughout the lungs, greater on the left than on the right.  No pleural effusion or pneumothorax.    The cardiac silhouette is normal in size. The hilar and mediastinal contours are unremarkable.    Bones are intact.                                 Medical Decision Making:   Initial Assessment:   24 y/o male found down at home, EMS gave 1g Narcan with minimal improvement en route.   On arrival, he is tachypnic, tachycardic, unresponsive.   Will intubate for airway protection, order labs, sepsis  evaluation, EKG, imaging, and anticipate ICU admission for further monitoring.  Differential Diagnosis:   Differential Diagnosis includes, but is not limited to:  CVA/TIA, seizure, status epilepticus, post-ictal state, meningitis/encephalitis, sepsis, MI/ACS, arrhythmia, syncope, intracranial mass/hemorrhage, head trauma, anaphylaxis, substance abuse, alcohol intoxication/withdrawal, medication reaction, intentional medication overdose, neuroleptic malignant syndrome, serotonin syndrome, CO poisoning, hypoxia/hypercapnea, hepatic encephalopathy, metabolic disturbance, thyroid disease, hypoglycemia.    Clinical Tests:   Lab Tests: Ordered and Reviewed  Radiological Study: Ordered and Reviewed  Medical Tests: Ordered and Reviewed  ED Management:  EKG sinus tach.  CT head without acute process.  CXR with pulmonary edema versus aspiration versus ARDS.  Patient covered for sepsis with IV fluids and broad-spectrum antibiotics.  On reassessment, patient now profoundly hypotensive.  CVL placed and Levophed started.  Troponin elevated to 0.2, bedside ultrasound concerning for low EF.  Discussed case with Dr. Johnston who recommended BP control and STAT echo.  Discussed patient with [facility] who will admit.  Other:   I have discussed this case with another health care provider.  Upon re-evaluation, the patient's status has remained critical.  At this time, I believe the patient should be admitted to the hospital for further evaluation and management of respiratory failure, FLAKITO, pulmonary edema.    [facility] service was contacted and the case was discussed. The consulting physician agrees with plan and will admit under their service. Additional recommendations at this time: none    The patient and family were updated with test results, overall impression, and further plan of care. All questions were answered. The patient expressed understanding and agreed with the current plan.                  Attending  Attestation:         Attending Critical Care:   Critical Care Times:   Direct Patient Care (initial evaluation, reassessments, and time considering the case)................................................................30 minutes.   Additional History from reviewing old medical records or taking additional history from the family, EMS, PCP, etc.......................15 minutes.   Ordering, Reviewing, and Interpreting Diagnostic Studies...............................................................................................................20 minutes.   Documentation..................................................................................................................................................................................10 minutes.   Consultation with other Physicians. .................................................................................................................................................10 minutes.   Consultation with the patient's family directly relating to the patient's condition, care, and DNR status (when patient unable)......10 minutes.   ==============================================================  · Total Critical Care Time - exclusive of procedural time: 95 minutes.  ==============================================================  Critical care was necessary to treat or prevent imminent or life-threatening deterioration of the following conditions: respiratory failure and overdose.   The following critical care procedures were done by me (see procedure notes): pulse oximetry and endotracheal tube placement *.   Critical care was time spent personally by me on the following activities: obtaining history from patient or relative, examination of patient, review of old charts, ordering lab, x-rays, and/or EKG, development of treatment plan with patient or relative, ordering and performing treatments and interventions, evaluation of patient's response to  treatment, discussion with consultants, interpretation of cardiac measurements and re-evaluation of patient's conition.   Critical Care Condition: life-threatening                  Clinical Impression:     1. Altered mental status    2. Encounter for central line placement    3. Intentional drug overdose, subsequent encounter    4. FLAKITO (acute kidney injury)    5. Polysubstance abuse    6. Respiratory failure with hypoxia and hypercapnia, unspecified chronicity    7. Hypotension, unspecified hypotension type    8. Respiratory failure            Disposition:   Disposition: Admitted    I, Dr. Zelalem Lindsey, personally performed the services described in this documentation. All medical record entries made by the scribe were at my direction and in my presence.  I have reviewed the chart and agree that the record reflects my personal performance and is accurate and complete.     Zelalem Lindsey MD.  5:19 PM 07/14/2018                     Zelalem Lindsey MD  07/14/18 7940

## 2018-07-15 LAB
ALBUMIN SERPL BCP-MCNC: 3.2 G/DL
ALLENS TEST: ABNORMAL
ALP SERPL-CCNC: 65 U/L
ALT SERPL W/O P-5'-P-CCNC: 57 U/L
ANION GAP SERPL CALC-SCNC: 2 MMOL/L
ANION GAP SERPL CALC-SCNC: 6 MMOL/L
AST SERPL-CCNC: 87 U/L
BACTERIA UR CULT: NO GROWTH
BILIRUB SERPL-MCNC: 2.8 MG/DL
BNP SERPL-MCNC: 374 PG/ML
BUN SERPL-MCNC: 11 MG/DL
BUN SERPL-MCNC: 9 MG/DL
CALCIUM SERPL-MCNC: 8 MG/DL
CALCIUM SERPL-MCNC: 8.4 MG/DL
CHLORIDE SERPL-SCNC: 112 MMOL/L
CHLORIDE SERPL-SCNC: 115 MMOL/L
CK SERPL-CCNC: 2043 U/L
CO2 SERPL-SCNC: 24 MMOL/L
CO2 SERPL-SCNC: 25 MMOL/L
CREAT SERPL-MCNC: 0.9 MG/DL
CREAT SERPL-MCNC: 1 MG/DL
DELSYS: ABNORMAL
ERYTHROCYTE [SEDIMENTATION RATE] IN BLOOD BY WESTERGREN METHOD: 30 MM/H
EST. GFR  (AFRICAN AMERICAN): >60 ML/MIN/1.73 M^2
EST. GFR  (AFRICAN AMERICAN): >60 ML/MIN/1.73 M^2
EST. GFR  (NON AFRICAN AMERICAN): >60 ML/MIN/1.73 M^2
EST. GFR  (NON AFRICAN AMERICAN): >60 ML/MIN/1.73 M^2
FIO2: 80
GLUCOSE SERPL-MCNC: 141 MG/DL
GLUCOSE SERPL-MCNC: 149 MG/DL
HCO3 UR-SCNC: 24.4 MMOL/L (ref 24–28)
LACTATE SERPL-SCNC: 2.8 MMOL/L
MAGNESIUM SERPL-MCNC: 2.1 MG/DL
MODE: ABNORMAL
PCO2 BLDA: 50.4 MMHG (ref 35–45)
PEEP: 16
PH SMN: 7.29 [PH] (ref 7.35–7.45)
PHOSPHATE SERPL-MCNC: 2.3 MG/DL
PO2 BLDA: 103 MMHG (ref 80–100)
POC BE: -2 MMOL/L
POC SATURATED O2: 97 % (ref 95–100)
POC TCO2: 26 MMOL/L (ref 23–27)
POCT GLUCOSE: 122 MG/DL (ref 70–110)
POCT GLUCOSE: 178 MG/DL (ref 70–110)
POTASSIUM SERPL-SCNC: 4.3 MMOL/L
POTASSIUM SERPL-SCNC: 5.7 MMOL/L
PROT SERPL-MCNC: 5.6 G/DL
SAMPLE: ABNORMAL
SITE: ABNORMAL
SODIUM SERPL-SCNC: 139 MMOL/L
SODIUM SERPL-SCNC: 145 MMOL/L
TROPONIN I SERPL DL<=0.01 NG/ML-MCNC: 1.24 NG/ML
VT: 380

## 2018-07-15 PROCEDURE — 36415 COLL VENOUS BLD VENIPUNCTURE: CPT

## 2018-07-15 PROCEDURE — 84484 ASSAY OF TROPONIN QUANT: CPT

## 2018-07-15 PROCEDURE — 20000000 HC ICU ROOM

## 2018-07-15 PROCEDURE — 80048 BASIC METABOLIC PNL TOTAL CA: CPT

## 2018-07-15 PROCEDURE — 82803 BLOOD GASES ANY COMBINATION: CPT

## 2018-07-15 PROCEDURE — 94003 VENT MGMT INPAT SUBQ DAY: CPT

## 2018-07-15 PROCEDURE — 83880 ASSAY OF NATRIURETIC PEPTIDE: CPT

## 2018-07-15 PROCEDURE — 27000221 HC OXYGEN, UP TO 24 HOURS

## 2018-07-15 PROCEDURE — 63600175 PHARM REV CODE 636 W HCPCS: Performed by: STUDENT IN AN ORGANIZED HEALTH CARE EDUCATION/TRAINING PROGRAM

## 2018-07-15 PROCEDURE — 83735 ASSAY OF MAGNESIUM: CPT

## 2018-07-15 PROCEDURE — 99233 SBSQ HOSP IP/OBS HIGH 50: CPT | Mod: ,,, | Performed by: INTERNAL MEDICINE

## 2018-07-15 PROCEDURE — 80053 COMPREHEN METABOLIC PANEL: CPT

## 2018-07-15 PROCEDURE — 94761 N-INVAS EAR/PLS OXIMETRY MLT: CPT

## 2018-07-15 PROCEDURE — 25000003 PHARM REV CODE 250: Performed by: STUDENT IN AN ORGANIZED HEALTH CARE EDUCATION/TRAINING PROGRAM

## 2018-07-15 PROCEDURE — 82550 ASSAY OF CK (CPK): CPT

## 2018-07-15 PROCEDURE — 25000242 PHARM REV CODE 250 ALT 637 W/ HCPCS: Performed by: STUDENT IN AN ORGANIZED HEALTH CARE EDUCATION/TRAINING PROGRAM

## 2018-07-15 PROCEDURE — 84100 ASSAY OF PHOSPHORUS: CPT

## 2018-07-15 PROCEDURE — 83605 ASSAY OF LACTIC ACID: CPT

## 2018-07-15 PROCEDURE — 99900035 HC TECH TIME PER 15 MIN (STAT)

## 2018-07-15 PROCEDURE — 94640 AIRWAY INHALATION TREATMENT: CPT

## 2018-07-15 RX ORDER — FENTANYL CITRAT/DEXTROSE 5%/PF 100 MCG/10
PATIENT CONTROLLED ANALGESIA SYRINGE INTRAVENOUS CONTINUOUS
Status: DISCONTINUED | OUTPATIENT
Start: 2018-07-15 | End: 2018-07-16

## 2018-07-15 RX ORDER — MIDAZOLAM HYDROCHLORIDE 1 MG/ML
2 INJECTION INTRAMUSCULAR; INTRAVENOUS
Status: DISCONTINUED | OUTPATIENT
Start: 2018-07-15 | End: 2018-07-17

## 2018-07-15 RX ORDER — ALBUTEROL SULFATE 0.83 MG/ML
10 SOLUTION RESPIRATORY (INHALATION) ONCE
Status: COMPLETED | OUTPATIENT
Start: 2018-07-15 | End: 2018-07-15

## 2018-07-15 RX ADMIN — PROPOFOL 50 MCG/KG/MIN: 10 INJECTION, EMULSION INTRAVENOUS at 05:07

## 2018-07-15 RX ADMIN — HYDROCORTISONE SODIUM SUCCINATE 100 MG: 100 INJECTION, POWDER, FOR SOLUTION INTRAMUSCULAR; INTRAVENOUS at 05:07

## 2018-07-15 RX ADMIN — CISATRACURIUM BESYLATE 3 MCG/KG/MIN: 10 INJECTION INTRAVENOUS at 12:07

## 2018-07-15 RX ADMIN — FENTANYL CITRATE 300 MCG/HR: 50 INJECTION, SOLUTION INTRAMUSCULAR; INTRAVENOUS at 06:07

## 2018-07-15 RX ADMIN — HYDROCORTISONE SODIUM SUCCINATE 100 MG: 100 INJECTION, POWDER, FOR SOLUTION INTRAMUSCULAR; INTRAVENOUS at 09:07

## 2018-07-15 RX ADMIN — ALBUTEROL SULFATE 10 MG: 2.5 SOLUTION RESPIRATORY (INHALATION) at 07:07

## 2018-07-15 RX ADMIN — PROPOFOL 50 MCG/KG/MIN: 10 INJECTION, EMULSION INTRAVENOUS at 01:07

## 2018-07-15 RX ADMIN — DEXTROSE MONOHYDRATE 25 G: 25 INJECTION, SOLUTION INTRAVENOUS at 08:07

## 2018-07-15 RX ADMIN — MINERAL OIL AND WHITE PETROLATUM: 150; 830 OINTMENT OPHTHALMIC at 05:07

## 2018-07-15 RX ADMIN — PROPOFOL 50 MCG/KG/MIN: 10 INJECTION, EMULSION INTRAVENOUS at 12:07

## 2018-07-15 RX ADMIN — MIDAZOLAM HYDROCHLORIDE 2 MG: 1 INJECTION, SOLUTION INTRAMUSCULAR; INTRAVENOUS at 11:07

## 2018-07-15 RX ADMIN — MINERAL OIL AND WHITE PETROLATUM: 150; 830 OINTMENT OPHTHALMIC at 10:07

## 2018-07-15 RX ADMIN — INSULIN HUMAN 10 UNITS: 100 INJECTION, SOLUTION PARENTERAL at 08:07

## 2018-07-15 RX ADMIN — HYDROCORTISONE SODIUM SUCCINATE 100 MG: 100 INJECTION, POWDER, FOR SOLUTION INTRAMUSCULAR; INTRAVENOUS at 01:07

## 2018-07-15 NOTE — EICU
eICU Note :    Called by the [facility] eRN:    Problem: AB.29/50/103/24/97 on mechanical ventilator with assist control of [address]/16/80%    Pertinent History and labs reviewed : 23 year-old female in cardiogenic shock, non-ST JENNIFER ,Respiratory failure on mechanical ventilator.    Treatment /Intervention given:FiO2 reduced to 70% and tidal volume increased to 420.  RPT ABG in an hour        Laura Camarillo M.D  eICU Physician

## 2018-07-15 NOTE — PLAN OF CARE
Problem: Patient Care Overview  Goal: Plan of Care Review  Outcome: Ongoing (interventions implemented as appropriate)  Pt sedated and paralyzed. BIS and train of four monitored. On mech vent to maintain patency of airway and optimize oxygenation. OGT to low, cont suction. Afebrile. No signs of pressure ulcer. Bed on lateral rotation for at least Q2H turns. Landers patent and draining clear, yellow urine. Bed in low position. Family at bedside. Will continue to monitor.

## 2018-07-15 NOTE — PROGRESS NOTES
Pulmonary & Critical Care Medicine Progress Note    Subjective:   Overnight, stopped the vasopressin as patient's ECHO results were back. Patient was paralyzed with improvement in saturations. This morning, turning of paralytic.    Vital Signs:   Vitals:    07/15/18 1545   BP:    Pulse:    Resp:    Temp: 98 °F (36.7 °C)     Fluid Balance:     Intake/Output Summary (Last 24 hours) at 07/15/18 1604  Last data filed at 07/15/18 1400   Gross per 24 hour   Intake          5402.74 ml   Output             6210 ml   Net          -807.26 ml     Physical Exam:   General: NAD, sedated, when weaning down sedation patient wakes up and moves all extremities, able to follow some commands.  HEENT: AT/NC, PERRL, oral mucosa moist.   Neck: Supple without JVD or palpable LAD.   Cardiac: S1S2 with brisk cap refill in distal extremities.  Respiratory: Lungs clear bilaterally with no increased work of breathing.   Abdomen: Soft, NT/ND. +BS.   Extremities: No edema.   Neuro: Grossly intact to brief exam.    Laboratory Studies:     Recent Labs  Lab 07/15/18  0457   PH 7.293*   PCO2 50.4*   PO2 103*   HCO3 24.4   POCSATURATED 97   BE -2     No results for input(s): WBC, RBC, HGB, HCT, PLT, MCV, MCH, MCHC in the last 24 hours.    Recent Labs  Lab 07/15/18  0453 07/15/18  1404    145   K 5.7* 4.3   * 115*   CO2 25 24   BUN 11 9   CREATININE 0.9 1.0   MG 2.1  --      Microbiology Data:   Microbiology Results (last 7 days)     Procedure Component Value Units Date/Time    Blood culture x two cultures. Draw prior to antibiotics. [[id number]] Collected:  07/14/18 0847    Order Status:  Completed Specimen:  Blood from Peripheral, Forearm, Left Updated:  07/15/18 1422     Blood Culture, Routine No Growth to date     Blood Culture, Routine No Growth to date    Narrative:       Aerobic and anaerobic    Culture, Respiratory with Gram Stain [[id number]] Collected:  07/14/18 1700    Order Status:  Completed Specimen:  Respiratory from  Endotracheal Aspirate Updated:  07/15/18 1147     Respiratory Culture No Growth     Gram Stain (Respiratory) <10 epithelial cells per low power field.     Gram Stain (Respiratory) Rare WBC's     Gram Stain (Respiratory) Few Gram positive cocci    Urine culture - High Risk [[id number]] Collected:  07/14/18 0757    Order Status:  Completed Specimen:  Urine from Urine, Catheterized Updated:  07/15/18 1105     Urine Culture, Routine No growth    Blood culture x two cultures. Draw prior to antibiotics. [[id number]] Collected:  07/14/18 0738    Order Status:  Completed Specimen:  Blood from Peripheral, Forearm, Right Updated:  07/15/18 1045     Blood Culture, Routine Gram stain aer bottle: Gram positive cocci in clusters resembling Staph     Blood Culture, Routine Results called to and read back by: Martin Garcia RN 07/15/2018  10:45    Narrative:       Aerobic and anaerobic         Chest Imaging:   No new imaging.     Infusions:     cisatracurium (NIMBEX) infusion 3 mcg/kg/min (07/15/18 0900)    fentanyl      norepinephrine bitartrate-D5W 0.32 mcg/kg/min (07/15/18 1315)    propofol 50 mcg/kg/min (07/15/18 1354)     Scheduled Medications:    hydrocortisone sodium succinate  100 mg Intravenous Q8H    white petrolatum-mineral oil   Both Eyes Q8H     PRN Medications:   dextrose 50%, dextrose 50%, dextrose 50%, glucagon (human recombinant), glucose, glucose, midazolam, sodium chloride 0.9%    Assessment & Plan:   Patient Active Problem List   Diagnosis    Respiratory failure    Polysubstance abuse    Acute respiratory failure with hypoxia and hypercarbia    Shock     This is a case of cardiogenic shock in the setting of ecstasy and drug abuse.    Neuro:  -Sedated with Fentanyl and Propofol at this time.  -Stopped Nimbex.  -Will wean sedation tomorrow for patient's first SBT. Will likely need Precedex when doing so.  -Utox (+) for opiates and marijuana.     Pulm:  -Cardiogenic vs noncardiogenic pulmonary edema  -Likely  cardiogenic given patient's new LVEF of 20%  -7.29/50/103/24/97% on 380/30/16/100%  -Nimbex is now off as patient's sats are in the high 90's.  -Currently PEEP and FiO2 down to 10/50%. Will continue weaning.  -Maintain O2 sats >88% or paO2 >55.  -Permissive hypercapnia is fine.     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  -On Levophed, coming down nicely.  -Vasopressin stopped in the setting of cardiogenic shock.  -Lactate 2.8  -If patient needs more pressor support, consider Dobutamine.  -Once pressor support continues to improve, will start diuresing.  -Hydrocortisone 100 mg IV q8h being continued for now.  -Trop down trending, peaked at 1.4     Renal:  -BUN/Cr 9/1.0 with GFR >60. FLAKITO resolved.  -CK 2043 at this time.  -Do not start maintenance fluid in the setting of cardiogenic shock.     ID:  -Afebrile, WBC 10 with neutrophilia  -Lactate 8.0 --> 2.8  -Procal 12.87 (in the setting of renal dysfunction)  -UA clean  -NGTD  -Respiratory culture pending.  -Can stop antibiotics at this time.    Thank you for involving us in the care of this patient. We will continue to follow along. Please call with any questions.    MD JENA [facility] PCCM Fellow, PGY 5  [facility]  Pager: [phone]

## 2018-07-15 NOTE — PROCEDURES
"Eliu Herrera is a 23 y.o. male patient.    Temp: 98.2 °F (36.8 °C) (18)  Pulse: 75 (18)  Resp: (!) 30 (18)  BP: (!) 142/62 (18)  SpO2: (!) 94 % (18)  Weight: 70 kg (154 lb 5.2 oz) (18 1200)  Height: 5' 8" (172.7 cm) (18)       Arterial Line  Date/Time: 2018 4:00 PM  Location procedure was performed: [facility] PULMONARY FUNCTION SVC  Performed by: CHRIS ANNA  Authorized by: CHRIS ANNA   Pre-op Diagnosis: Hemodynamic instability  Post-operative diagnosis: Hemodynamic instability  Consent Done: Yes  Consent: Verbal consent obtained. Written consent obtained.  Risks and benefits: risks, benefits and alternatives were discussed  Consent given by: parent  Patient understanding: patient states understanding of the procedure being performed  Patient consent: the patient's understanding of the procedure matches consent given  Procedure consent: procedure consent matches procedure scheduled  Relevant documents: relevant documents present and verified  Test results: test results available and properly labeled  Site marked: the operative site was marked  Imaging studies: imaging studies available  Patient identity confirmed: , MRN and name  Time out: Immediately prior to procedure a "time out" was called to verify the correct patient, procedure, equipment, support staff and site/side marked as required.  Preparation: Patient was prepped and draped in the usual sterile fashion.  Indications: multiple ABGs, respiratory failure and hemodynamic monitoring  Location: left radial  Patient sedated: yes  Sedatives: fentanyl and propofol  Jair's test normal: yes  Needle gauge: 18  Number of attempts: 2  Technical procedures used: U/S guided  Complications: No  Specimens: No  Implants: No  Post-procedure: line sutured and dressing applied  Post-procedure CMS: normal  Patient tolerance: Patient tolerated the procedure well with no immediate " complications          Jennifer Ba  7/15/2018

## 2018-07-15 NOTE — PLAN OF CARE
Problem: Patient Care Overview  Goal: Plan of Care Review  Outcome: Ongoing (interventions implemented as appropriate)  Pt's SpO2 96% on mechanical ventilator settings AC 30 RR/ 420 VT/ 16 PEEP/ 70%. Decreased to 60% with SpO2 96%. No respiratory distress noted. Will continue to monitor SpO2.

## 2018-07-15 NOTE — PROGRESS NOTES
Results for ADAM WAYNE (MRN [id number]) as of 7/15/2018 05:38   Ref. Range 7/15/2018 04:57   POC PH Latest Ref Range: 7.35 - 7.45  7.293 (L)   POC PCO2 Latest Ref Range: 35 - 45 mmHg 50.4 (H)   POC PO2 Latest Ref Range: 80 - 100 mmHg 103 (H)   POC BE Latest Ref Range: -2 to 2 mmol/L -2   POC HCO3 Latest Ref Range: 24 - 28 mmol/L 24.4   POC SATURATED O2 Latest Ref Range: 95 - 100 % 97   POC TCO2 Latest Ref Range: 23 - 27 mmol/L 26   FiO2 Unknown 80   Vt Unknown 380   PEEP Unknown 16   Sample Unknown ARTERIAL   DelSys Unknown Adult Vent   Allens Test Unknown N/A   Site Unknown [place]/UAC   Mode Unknown AC/PRVC   Rate Unknown 30   Results reported to Dr. Carr

## 2018-07-15 NOTE — PLAN OF CARE
Spoke with Dr Ba via telephone. Updated Dr on hourly UOP and Blood pressures. Titrating down on levophed. Dr ordered to d/c IVF and vasopressin. Ordered to not give IVF if or when UOP decreases.

## 2018-07-15 NOTE — PROGRESS NOTES
Progress Note            [facility]    Subjective: Patient still sedated on ventilator. Had discussion with family. Drug use unknown to family. Mother said he was complaining of some chest pain last week but he work an entire day just Friday and from description of job it is very exertional. BP is stable and HR is better. No arrhythmias on telemetry.  UOP is appropriate.      Review of patient's allergies indicates:   Allergen Reactions    Penicillins Rash          albuterol sulfate  10 mg Nebulization Once    dextrose 50%  25 g Intravenous Once    hydrocortisone sodium succinate  100 mg Intravenous Q8H    insulin regular  10 Units Intravenous Once    white petrolatum-mineral oil   Both Eyes Q8H        cisatracurium (NIMBEX) infusion 3.5 mcg/kg/min (07/15/18 0541)    fentanyl 300 mcg/hr (07/14/18 2245)    norepinephrine bitartrate-D5W 0.54 mcg/kg/min (07/15/18 0712)    propofol 50 mcg/kg/min (07/15/18 0525)       dextrose 50%, dextrose 50%, dextrose 50%, glucagon (human recombinant), glucose, glucose, sodium chloride 0.9%    Vitals:    07/15/18 0515 07/15/18 0525 07/15/18 0600 07/15/18 0710   BP:   121/61    BP Location:       Patient Position:       Pulse: 90  83    Resp: (!) 30  (!) 30    Temp:    98.5 °F (36.9 °C)   TempSrc:    Oral   SpO2: 95%  95%    Weight:  71.4 kg (157 lb 6.5 oz)     Height:           Physical Examination:  General Appearance: No acute distress  Mental: Alert to person, time and place  HEENT: Perrl  Chest: No pain with palpitations, no chest deformities  Heart: RRR, no murmurs, no gallops or rubs  Lung: CTAB  ABDOMEN: Soft, nontender, nondistended with good bowel sounds heard. No mass or hepatosplenomegaly  EXTREMITIES: Without cyanosis, clubbing or edema.   NEUROLOGICAL: Gross nonfocal. Skin: Warm and dry without any rash. There is no costovertebral angle tenderness.   Skin: no rashes lesions or ulcers    Lab Results   Component Value Date      07/15/2018    K 5.7 (H) 07/15/2018     (H) 07/15/2018    CO2 25 07/15/2018    BUN 11 07/15/2018    CREATININE 0.9 07/15/2018     (H) 07/15/2018    HGBA1C 5.1 07/14/2018    MG 2.1 07/15/2018    AST 87 (H) 07/15/2018    ALT 57 (H) 07/15/2018    ALBUMIN 3.2 (L) 07/15/2018    PROT 5.6 (L) 07/15/2018    BILITOT 2.8 (H) 07/15/2018    WBC 10.56 07/14/2018    HGB 15.8 07/14/2018    HCT 48.2 07/14/2018    MCV 93 07/14/2018     07/14/2018    INR 1.3 (H) 07/14/2018         Recent Labs  Lab 07/14/18  1154  07/15/18  0013   CPK 2314*  --   --    TROPONINI 0.549*  < > 1.244*   < > = values in this interval not displayed.      Recent Labs  Lab 07/14/18  1154  07/15/18  0013   CPK 2314*  --   --    TROPONINI 0.549*  < > 1.244*   < > = values in this interval not displayed.      No results found for: TSH    Lab Results   Component Value Date    HGBA1C 5.1 07/14/2018           Images and labs reviewed    ECG: Sinus tachycardia  Telemetry: No arrhythmias  Echo: ef 25% with PH    Assessment:     1. Shock, Cardiogenic  2. NSTEMI demand perfusion mismatch  3. Respiratory Failure- on ventilator        Plan:     Continue Levophed  No IVF with reduce ef  Correct potassium  Wean off vent as tolerate  Cardiomyopathy stress induce. Will require bb and acei when more stable. Unlikely this was cause of initial arrest.      Cyn Lord MD  [facility]

## 2018-07-15 NOTE — PROGRESS NOTES
[facility]  Progress Note    Subjective:       Chief Complaint/Reason for Admission: Drug Overdose    Interval History: Patient seen and examined this am. Intubated and sedated with propofol and fentanyl. Still requiring pressors. Family present at bedside.     Review of Systems:  Unable to perform secondary to the patient clinical condition. The patient was intubated during the encounter.     Patient Active Problem List    Diagnosis Date Noted    Respiratory failure 07/14/2018    Polysubstance abuse     Acute respiratory failure with hypoxia and hypercarbia     Shock      History reviewed. No pertinent past medical history.   History reviewed. No pertinent surgical history.   No prescriptions prior to admission.     Review of patient's allergies indicates:   Allergen Reactions    Penicillins Rash      Social History   Substance Use Topics    Smoking status: Current Every Day Smoker    Smokeless tobacco: Never Used    Alcohol use Not on file      History reviewed. No pertinent family history.       OBJECTIVE:     Vital signs in last 24 hours:  Temp:  [98 °F (36.7 °C)-98.8 °F (37.1 °C)] 98.5 °F (36.9 °C)  Pulse:  [] 96  Resp:  [19-33] 30  SpO2:  [70 %-97 %] 97 %  BP: ()/(29-81) 121/61  Arterial Line BP: ()/(50-70) 117/63    Constitutional: He appears well-developed.    General: The patient is intubated.  HENT:   Neck: supple w/o JVD  Head: Normocephalic and atraumatic.   Eyes: Pupils were symmetric and approximately 6 mm in size.   Neck: No traumatic lesions noted.   Cardiovascular: Sinus rhythm.  Exam reveals no gallop, murmurs, and no friction rub. +Tachycardia.  Pulmonary/Chest: Mechanical breath sounds.  Abdominal: Soft. He exhibits no distension.    Musculoskeletal: unable to access adequately secondary to his current clinical condition.   Neurological: Sedated  Skin: Skin is warm and dry. No rashes noted.     Data Review:   CBC:   Lab Results   Component Value Date    WBC 10.56 07/14/2018     RBC 5.21 07/14/2018    HGB 15.8 07/14/2018    HCT 48.2 07/14/2018     07/14/2018     BMP:   Lab Results   Component Value Date     (H) 07/15/2018     07/15/2018    K 5.7 (H) 07/15/2018     (H) 07/15/2018    CO2 25 07/15/2018    BUN 11 07/15/2018    CREATININE 0.9 07/15/2018    CALCIUM 8.0 (L) 07/15/2018     Coagulation:   Lab Results   Component Value Date    INR 1.3 (H) 07/14/2018    APTT 27.4 07/14/2018     Cardiac markers: No results found for: CKMB, TROPONINT, MYOGLOBIN  ABGs:   Lab Results   Component Value Date    PH 7.293 (L) 07/15/2018    PO2 103 (H) 07/15/2018    PCO2 50.4 (H) 07/15/2018     Radiology review:   X-Ray Chest 1 View  New right internal jugular central venous catheter tip overlies the SVC.  No pneumothorax.    CT Head Without Contrast  No acute abnormality.    X-Ray Chest AP Portable  Diffuse hazy airspace disease    ASSESSMENT/PLAN:     Active Hospital Problems    Diagnosis  POA    *Acute respiratory failure with hypoxia and hypercarbia [J96.01, J96.02]  Unknown    Respiratory failure [J96.90]  Yes    Polysubstance abuse [F19.10]  Unknown    Shock [R57.9]  Unknown      Resolved Hospital Problems    Diagnosis Date Resolved POA   No resolved problems to display.     Mr. Eliu Herrera w/ no significant PMHx presented to the ED in respiratory failure secondary to possible drug overdose.    NEURO-PSYCH:   -Drug panel positive for opioids and THC   -possible ingestion of ecstacy as well  -intubated and sedated with fentanyl and propofol     CV   Hypotension likely secondary to Cardiogenic Shock  - Landers in place to monitor I/O, Landers to gravity  - Currently on Norepi (Pressor)  - Continue telemetry  - Keep MAP >65 mm Hg  - Keep Mg 2, K 4  - LA 8 -->3.1  - Per Cardiology, will require a BB and ACEI when more stable   -NSTEMI 2/2 demand mismatch  -EF of 25-30%     PULM:  Respiratory failure  - Intubated in the ED  - Consulted Pulmonary/Critical Care and appreciate  recommendations  - Continue to monitor ABG's  - F/u blood cxs/ urine cxs, placed on broad spectrum abxs: Vanco and Zosyn  - Neuro checks q 4 hours  - Continue Pulse Ox     RENAL:  FLAKITO improving iwt fluids  CPK level elevated- continue to trend  UOP adequate    Endo:  -SSI    FEN-GI:  K of 5.7 this am- given insulin and albuterol- will recheck BMP    HEME:   -No acute issues at this time     ID:  -Vanc and zosyn started   -Urine culture and UA ordered  -Blood culture ordered- negative preliminary  -Urine culture ordered  -Procalc 12.87    PPX  -SCD    Dispo:  Attempt to wean sedation, follow up CPK    Daniel Haas MD  [facility] PGY2  8:57 AM

## 2018-07-15 NOTE — NURSING
Verbal orders given to turn off nimbex and decrease fentanyl to 250mcg/h. TOF rechecked 15 minutes after discontinuation; 1/4 twitches. Will recheck again in 15 minutes.     TOF at 1000: 4/4. Pt giving thumbs up when prompted. Will increase sedation and maintain pt at RASS of -4 for comfort.

## 2018-07-15 NOTE — PROVIDER TRANSFER
ABG obtained per order. FIO2 weaned to 70% and Vt increased to 420 to reduce CO2 and increase pH. Will continue to monitor. Dr Bruno MCKEON contacted and agreed with vent changes and would like a follow up ABG once pulmonary arrives.

## 2018-07-15 NOTE — PLAN OF CARE
Problem: Patient Care Overview  Goal: Plan of Care Review  Outcome: Ongoing (interventions implemented as appropriate)  No acute events occurred throughout shift. Fentanyl gtt @ 300mcg/h, propofol gtt @ 50mKm, levo gtt @ 0.35mKm. BIS score of 47, pt appears comfortable. Vent settings currently 30/420/50%/10 Plan of care reviewed with pt and pt's family at 1400. All questions and concerns addressed. See flowsheets for VS and assessment information. Will continue to monitor.

## 2018-07-16 PROBLEM — I50.21 ACUTE SYSTOLIC HEART FAILURE: Status: ACTIVE | Noted: 2018-07-16

## 2018-07-16 LAB
ALBUMIN SERPL BCP-MCNC: 2.7 G/DL
ALLENS TEST: ABNORMAL
ALP SERPL-CCNC: 61 U/L
ALT SERPL W/O P-5'-P-CCNC: 48 U/L
ANION GAP SERPL CALC-SCNC: 6 MMOL/L
AST SERPL-CCNC: 64 U/L
BACTERIA SPEC AEROBE CULT: NORMAL
BILIRUB SERPL-MCNC: 2.2 MG/DL
BUN SERPL-MCNC: 11 MG/DL
CALCIUM SERPL-MCNC: 8.7 MG/DL
CHLORIDE SERPL-SCNC: 114 MMOL/L
CK SERPL-CCNC: 1147 U/L
CO2 SERPL-SCNC: 26 MMOL/L
CREAT SERPL-MCNC: 0.8 MG/DL
DELSYS: ABNORMAL
ERYTHROCYTE [SEDIMENTATION RATE] IN BLOOD BY WESTERGREN METHOD: 30 MM/H
EST. GFR  (AFRICAN AMERICAN): >60 ML/MIN/1.73 M^2
EST. GFR  (NON AFRICAN AMERICAN): >60 ML/MIN/1.73 M^2
FIO2: 40
GLUCOSE SERPL-MCNC: 127 MG/DL
GRAM STN SPEC: NORMAL
HCO3 UR-SCNC: 26.5 MMOL/L (ref 24–28)
MAGNESIUM SERPL-MCNC: 1.8 MG/DL
MODE: ABNORMAL
PCO2 BLDA: 35.4 MMHG (ref 35–45)
PEEP: 8
PH SMN: 7.48 [PH] (ref 7.35–7.45)
PHOSPHATE SERPL-MCNC: 1.7 MG/DL
PO2 BLDA: 145 MMHG (ref 80–100)
POC BE: 3 MMOL/L
POC SATURATED O2: 99 % (ref 95–100)
POC TCO2: 28 MMOL/L (ref 23–27)
POTASSIUM SERPL-SCNC: 4 MMOL/L
PROT SERPL-MCNC: 5.3 G/DL
SAMPLE: ABNORMAL
SITE: ABNORMAL
SODIUM SERPL-SCNC: 146 MMOL/L
VT: 420

## 2018-07-16 PROCEDURE — 25000003 PHARM REV CODE 250: Performed by: STUDENT IN AN ORGANIZED HEALTH CARE EDUCATION/TRAINING PROGRAM

## 2018-07-16 PROCEDURE — 25000003 PHARM REV CODE 250: Performed by: INTERNAL MEDICINE

## 2018-07-16 PROCEDURE — 93010 ELECTROCARDIOGRAM REPORT: CPT | Mod: ,,, | Performed by: INTERNAL MEDICINE

## 2018-07-16 PROCEDURE — 99233 SBSQ HOSP IP/OBS HIGH 50: CPT | Mod: ,,, | Performed by: INTERNAL MEDICINE

## 2018-07-16 PROCEDURE — 94761 N-INVAS EAR/PLS OXIMETRY MLT: CPT

## 2018-07-16 PROCEDURE — 83735 ASSAY OF MAGNESIUM: CPT

## 2018-07-16 PROCEDURE — 99233 SBSQ HOSP IP/OBS HIGH 50: CPT | Mod: ,,, | Performed by: NURSE PRACTITIONER

## 2018-07-16 PROCEDURE — 93005 ELECTROCARDIOGRAM TRACING: CPT

## 2018-07-16 PROCEDURE — 84100 ASSAY OF PHOSPHORUS: CPT

## 2018-07-16 PROCEDURE — 25000003 PHARM REV CODE 250: Performed by: EMERGENCY MEDICINE

## 2018-07-16 PROCEDURE — 82803 BLOOD GASES ANY COMBINATION: CPT

## 2018-07-16 PROCEDURE — 82550 ASSAY OF CK (CPK): CPT

## 2018-07-16 PROCEDURE — 27000221 HC OXYGEN, UP TO 24 HOURS

## 2018-07-16 PROCEDURE — 37799 UNLISTED PX VASCULAR SURGERY: CPT

## 2018-07-16 PROCEDURE — 63600175 PHARM REV CODE 636 W HCPCS: Performed by: STUDENT IN AN ORGANIZED HEALTH CARE EDUCATION/TRAINING PROGRAM

## 2018-07-16 PROCEDURE — 99900035 HC TECH TIME PER 15 MIN (STAT)

## 2018-07-16 PROCEDURE — 97802 MEDICAL NUTRITION INDIV IN: CPT

## 2018-07-16 PROCEDURE — 20000000 HC ICU ROOM

## 2018-07-16 PROCEDURE — 25000003 PHARM REV CODE 250

## 2018-07-16 PROCEDURE — 80053 COMPREHEN METABOLIC PANEL: CPT

## 2018-07-16 RX ORDER — FENTANYL CITRAT/DEXTROSE 5%/PF 100 MCG/10
PATIENT CONTROLLED ANALGESIA SYRINGE INTRAVENOUS CONTINUOUS
Status: DISCONTINUED | OUTPATIENT
Start: 2018-07-16 | End: 2018-07-16

## 2018-07-16 RX ORDER — DEXMEDETOMIDINE HYDROCHLORIDE 4 UG/ML
0.2 INJECTION, SOLUTION INTRAVENOUS CONTINUOUS
Status: DISCONTINUED | OUTPATIENT
Start: 2018-07-16 | End: 2018-07-17

## 2018-07-16 RX ORDER — DEXMEDETOMIDINE HYDROCHLORIDE 4 UG/ML
0.2 INJECTION, SOLUTION INTRAVENOUS CONTINUOUS
Status: DISCONTINUED | OUTPATIENT
Start: 2018-07-16 | End: 2018-07-16

## 2018-07-16 RX ADMIN — SODIUM PHOSPHATE, MONOBASIC, MONOHYDRATE 20.01 MMOL: 276; 142 INJECTION, SOLUTION INTRAVENOUS at 06:07

## 2018-07-16 RX ADMIN — MINERAL OIL AND WHITE PETROLATUM: 150; 830 OINTMENT OPHTHALMIC at 06:07

## 2018-07-16 RX ADMIN — HYDROCORTISONE SODIUM SUCCINATE 100 MG: 100 INJECTION, POWDER, FOR SOLUTION INTRAMUSCULAR; INTRAVENOUS at 02:07

## 2018-07-16 RX ADMIN — HYDROCORTISONE SODIUM SUCCINATE 100 MG: 100 INJECTION, POWDER, FOR SOLUTION INTRAMUSCULAR; INTRAVENOUS at 06:07

## 2018-07-16 RX ADMIN — DEXMEDETOMIDINE HYDROCHLORIDE 0.2 MCG/KG/HR: 4 INJECTION, SOLUTION INTRAVENOUS at 10:07

## 2018-07-16 RX ADMIN — FENTANYL CITRATE 300 MCG/HR: 50 INJECTION, SOLUTION INTRAMUSCULAR; INTRAVENOUS at 03:07

## 2018-07-16 RX ADMIN — HYDROCORTISONE SODIUM SUCCINATE 100 MG: 100 INJECTION, POWDER, FOR SOLUTION INTRAMUSCULAR; INTRAVENOUS at 09:07

## 2018-07-16 RX ADMIN — PROPOFOL 40 MCG/KG/MIN: 10 INJECTION, EMULSION INTRAVENOUS at 12:07

## 2018-07-16 RX ADMIN — Medication 0.25 MCG/KG/MIN: at 12:07

## 2018-07-16 NOTE — PROGRESS NOTES
[facility]  Cardiology  Progress Note    Patient Name: Eliu Herrera  MRN: [id number]  Admission Date: 7/14/2018  Hospital Length of Stay: 2 days  Code Status: Full Code   Attending Physician: Severyn Yaroshevsky, MD   Primary Care Physician: Primary Doctor No  Expected Discharge Date:   Principal Problem:Acute respiratory failure with hypoxia and hypercarbia    Subjective:     Hospital Course:   07/16/2018 SBT in process. Patient awake and alert, responding appropriately to simple commands. SB with occasional junctional bradycardia on tele. Continues to require levo for pressure support. Troponin trending down at this time.       ROS; negative    Objective:     Vital Signs (Most Recent):  Temp: 98.8 °F (37.1 °C) (07/16/18 0703)  Pulse: 63 (07/16/18 1022)  Resp: (!) 30 (07/16/18 1022)  BP: (!) 124/59 (07/16/18 0900)  SpO2: 97 % (07/16/18 1022) Vital Signs (24h Range):  Temp:  [98 °F (36.7 °C)-99.4 °F (37.4 °C)] 98.8 °F (37.1 °C)  Pulse:  [52-96] 63  Resp:  [30-38] 30  SpO2:  [96 %-100 %] 97 %  BP: ()/(48-68) 124/59  Arterial Line BP: (101-132)/(53-89) 120/67     Weight: 69.7 kg (153 lb 10.6 oz)  Body mass index is 23.36 kg/m².     SpO2: 97 %  O2 Device (Oxygen Therapy): ventilator      Intake/Output Summary (Last 24 hours) at 07/16/18 1039  Last data filed at 07/16/18 0912   Gross per 24 hour   Intake          2229.79 ml   Output             2060 ml   Net           169.79 ml       Lines/Drains/Airways     Central Venous Catheter Line                 Percutaneous Central Line Insertion/Assessment - triple lumen  07/14/18 0937 right internal jugular 2 days          Drain                 NG/OG Tube 07/14/18 Center mouth 2 days         Urethral Catheter 07/14/18 0756 Straight-tip 14 Fr. 2 days          Airway                 Airway - Non-Surgical 07/14/18 0732 Endotracheal Tube 2 days          Arterial Line                 Arterial Line 07/14/18 1512 Left Radial 1 day          Peripheral  Intravenous Line                 Peripheral IV - Single Lumen 07/14/18 0720 Left Wrist 2 days         Peripheral IV - Single Lumen 07/14/18 0737 Right Forearm 2 days         Peripheral IV - Single Lumen 07/14/18 0827 Right Antecubital 2 days         Peripheral IV - Single Lumen 07/14/18 0846 Left Forearm 2 days                Physical Exam   Constitutional: He appears well-developed and well-nourished.   HENT: NO JVD  Head: Normocephalic.   Eyes: Right eye exhibits no discharge. Left eye exhibits no discharge.   Neck: No JVD present.   Cardiovascular: Normal rate, regular rhythm and normal heart sounds.  Exam reveals no gallop and no friction rub.    No murmur heard.  Pulmonary/Chest: Effort normal and breath sounds normal.   Abdominal: Soft. Bowel sounds are normal.   Musculoskeletal: He exhibits no edema.   Skin: Skin is warm and dry.       Significant Labs:   Blood Culture: No results for input(s): LABBLOO in the last 48 hours., BMP:   Recent Labs  Lab 07/14/18  1154 07/15/18  0453 07/15/18  1404 07/16/18  0400   * 149* 141* 127*    139 145 146*   K 4.2 5.7* 4.3 4.0   * 112* 115* 114*   CO2 22* 25 24 26   BUN 19 11 9 11   CREATININE 1.7* 0.9 1.0 0.8   CALCIUM 7.5* 8.0* 8.4* 8.7   MG 1.5* 2.1  --  1.8   , CMP   Recent Labs  Lab 07/14/18  1154 07/15/18  0453 07/15/18  1404 07/16/18  0400    139 145 146*   K 4.2 5.7* 4.3 4.0   * 112* 115* 114*   CO2 22* 25 24 26   * 149* 141* 127*   BUN 19 11 9 11   CREATININE 1.7* 0.9 1.0 0.8   CALCIUM 7.5* 8.0* 8.4* 8.7   PROT 4.7* 5.6*  --  5.3*   ALBUMIN 2.9* 3.2*  --  2.7*   BILITOT 2.0* 2.8*  --  2.2*   ALKPHOS 61 65  --  61   AST 80* 87*  --  64*   ALT 44 57*  --  48*   ANIONGAP 6* 2* 6* 6*   ESTGFRAFRICA >60 >60 >60 >60   EGFRNONAA 56* >60 >60 >60   , CBC   Recent Labs  Lab 07/14/18  1154   WBC 10.56   HGB 15.8   HCT 48.2      , INR   Recent Labs  Lab 07/14/18  1154   INR 1.3*   , Lipid Panel No results for input(s): CHOL, HDL,  LDLCALC, TRIG, CHOLHDL in the last 48 hours., Troponin   Recent Labs  Lab 07/14/18  1154 07/14/18  2022 07/15/18  0013   TROPONINI 0.549* 1.413* 1.244*    and All pertinent lab results from the last 24 hours have been reviewed.    Significant Imaging: Echocardiogram:   2D echo with color flow doppler:   Results for orders placed or performed during the hospital encounter of 07/14/18   2D echo with color flow doppler   Result Value Ref Range    EF 25 (A) 55 - 65    Diastolic Dysfunction Yes (A)     Est. PA Systolic Pressure 33.15     Mitral Valve Mobility NORMAL     Tricuspid Valve Regurgitation MILD      Assessment and Plan:     Brief HPI:  Patient seen this morning on rounds with family at bedside, SBT in process, patient responding appropriately to simple commands.     Acute systolic heart failure    LVEF 20-25% per echo   Continues to require pressor for pressure support  Euvolemic on exam   Intermittent junctional rhythm on tele review- monitoring   Will initiate BB and ACEI when more stable for GDMT   NSTEMI - demand in setting of polysubstance abuse with respiratory failure    FLAKITO and shock liver improving     2229 cc intake with 3075 cc output     Repeat echo 90D post GDMT initiation to re-eval LVEF               VTE Risk Mitigation         Ordered     IP VTE LOW RISK PATIENT  Once      07/14/18 1152     Place sequential compression device  Until discontinued      07/14/18 1152          Que Moise NP  Cardiology  [facility]

## 2018-07-16 NOTE — SUBJECTIVE & OBJECTIVE
Review of Systems   Unable to perform ROS: intubated     Objective:     Vital Signs (Most Recent):  Temp: 98.8 °F (37.1 °C) (07/16/18 0703)  Pulse: 63 (07/16/18 1022)  Resp: (!) 30 (07/16/18 1022)  BP: (!) 124/59 (07/16/18 0900)  SpO2: 97 % (07/16/18 1022) Vital Signs (24h Range):  Temp:  [98 °F (36.7 °C)-99.4 °F (37.4 °C)] 98.8 °F (37.1 °C)  Pulse:  [52-96] 63  Resp:  [30-38] 30  SpO2:  [96 %-100 %] 97 %  BP: ()/(48-68) 124/59  Arterial Line BP: (101-132)/(53-89) 120/67     Weight: 69.7 kg (153 lb 10.6 oz)  Body mass index is 23.36 kg/m².     SpO2: 97 %  O2 Device (Oxygen Therapy): ventilator      Intake/Output Summary (Last 24 hours) at 07/16/18 1039  Last data filed at 07/16/18 0912   Gross per 24 hour   Intake          2229.79 ml   Output             2060 ml   Net           169.79 ml       Lines/Drains/Airways     Central Venous Catheter Line                 Percutaneous Central Line Insertion/Assessment - triple lumen  07/14/18 0937 right internal jugular 2 days          Drain                 NG/OG Tube 07/14/18 Center mouth 2 days         Urethral Catheter 07/14/18 0756 Straight-tip 14 Fr. 2 days          Airway                 Airway - Non-Surgical 07/14/18 0732 Endotracheal Tube 2 days          Arterial Line                 Arterial Line 07/14/18 1512 Left Radial 1 day          Peripheral Intravenous Line                 Peripheral IV - Single Lumen 07/14/18 0720 Left Wrist 2 days         Peripheral IV - Single Lumen 07/14/18 0737 Right Forearm 2 days         Peripheral IV - Single Lumen 07/14/18 0827 Right Antecubital 2 days         Peripheral IV - Single Lumen 07/14/18 0846 Left Forearm 2 days                Physical Exam   Constitutional: He appears well-developed and well-nourished. He is intubated.   HENT:   Head: Normocephalic.   Eyes: Right eye exhibits no discharge. Left eye exhibits no discharge.   Neck: No JVD present.   Cardiovascular: Normal rate, regular rhythm and normal heart sounds.   Exam reveals no gallop and no friction rub.    No murmur heard.  Pulmonary/Chest: Effort normal and breath sounds normal. He is intubated.   Abdominal: Soft. Bowel sounds are normal.   Musculoskeletal: He exhibits no edema.   Skin: Skin is warm and dry.       Significant Labs:   Blood Culture: No results for input(s): LABBLOO in the last 48 hours., BMP:   Recent Labs  Lab 07/14/18  1154 07/15/18  0453 07/15/18  1404 07/16/18  0400   * 149* 141* 127*    139 145 146*   K 4.2 5.7* 4.3 4.0   * 112* 115* 114*   CO2 22* 25 24 26   BUN 19 11 9 11   CREATININE 1.7* 0.9 1.0 0.8   CALCIUM 7.5* 8.0* 8.4* 8.7   MG 1.5* 2.1  --  1.8   , CMP   Recent Labs  Lab 07/14/18  1154 07/15/18  0453 07/15/18  1404 07/16/18  0400    139 145 146*   K 4.2 5.7* 4.3 4.0   * 112* 115* 114*   CO2 22* 25 24 26   * 149* 141* 127*   BUN 19 11 9 11   CREATININE 1.7* 0.9 1.0 0.8   CALCIUM 7.5* 8.0* 8.4* 8.7   PROT 4.7* 5.6*  --  5.3*   ALBUMIN 2.9* 3.2*  --  2.7*   BILITOT 2.0* 2.8*  --  2.2*   ALKPHOS 61 65  --  61   AST 80* 87*  --  64*   ALT 44 57*  --  48*   ANIONGAP 6* 2* 6* 6*   ESTGFRAFRICA >60 >60 >60 >60   EGFRNONAA 56* >60 >60 >60   , CBC   Recent Labs  Lab 07/14/18  1154   WBC 10.56   HGB 15.8   HCT 48.2      , INR   Recent Labs  Lab 07/14/18  1154   INR 1.3*   , Lipid Panel No results for input(s): CHOL, HDL, LDLCALC, TRIG, CHOLHDL in the last 48 hours., Troponin   Recent Labs  Lab 07/14/18  1154 07/14/18 2022 07/15/18  0013   TROPONINI 0.549* 1.413* 1.244*    and All pertinent lab results from the last 24 hours have been reviewed.    Significant Imaging: Echocardiogram:   2D echo with color flow doppler:   Results for orders placed or performed during the hospital encounter of 07/14/18   2D echo with color flow doppler   Result Value Ref Range    EF 25 (A) 55 - 65    Diastolic Dysfunction Yes (A)     Est. PA Systolic Pressure 33.15     Mitral Valve Mobility NORMAL     Tricuspid Valve  Regurgitation MILD

## 2018-07-16 NOTE — PLAN OF CARE
Problem: Patient Care Overview  Goal: Plan of Care Review  Outcome: Ongoing (interventions implemented as appropriate)  Pt's SpO2 98% on mechanical ventilation settings AC 30 RR/ 420 VT/ 5 PEEP/ 30% FiO2. Pt is resting comfortably and Pt's mother, sister, and aunt is at bedside. No respiratory distress noted. Will continue to monitor SpO2.

## 2018-07-16 NOTE — PROGRESS NOTES
Pt intubated but responding .  Pt's mother, aunt, and sister ,Mechelle [phone], at bedside. Tn offered to call  free of charge as pt's mother is [place] speaking only; but mother and sister declined as sister stated she has been interpreting. Family updated on plan of care and Tn also inquired about medical insurance as none is listed in computer . Pt's sister informed Tn they are trying to locate pt's wallet to get card.

## 2018-07-16 NOTE — PROGRESS NOTES
Per MD order with RT at beside, pt extubated to 3L NC. Sats >92%. Pt AAOx4. Family at bedside. Will cont to monitor.

## 2018-07-16 NOTE — CONSULTS
"  [facility]  Adult Nutrition  Consult Note    SUMMARY     Recommendations    Recommendation/Intervention:   1. When medically acceptable, consult ST for swallow eval.     Goals:   Diet will be started witin 24-48 hours  Nutrition Goal Status: new  Communication of RD Recs: discussed on rounds    Reason for Assessment  Reason for Assessment: consult (NPO/intubated)  Diagnosis:  (resp failure, s/p drug overdose)  Relevant Medical History: none  General Information Comments: Pt intubated at suzy of visit. Just extubated. Has central line.     Nutrition/Diet History  Food Preferences: unable to assess  Factors Affecting Nutritional Intake: NPO    Anthropometrics  Temp: 98.7 °F (37.1 °C)  Height Method: Stated (stated on license per mom)  Height: 5' 8" (172.7 cm)  Height (inches): 68 in  Weight Method: Bed Scale  Weight: 69.7 kg (153 lb 10.6 oz)  Weight (lb): 153.66 lb  Ideal Body Weight (IBW), Male: 154 lb  % Ideal Body Weight, Male (lb): 99.78 lb  BMI (Calculated): 23.4  BMI Grade: 18.5-24.9 - normal     Lab/Procedures/Meds  Pertinent Labs Reviewed: reviewed  Pertinent Labs Comments: Na 146H, Glu 127H, Alb 2.7L  Pertinent Medications Reviewed: reviewed  Pertinent Medications Comments: precedex    Physical Findings/Assessment  Overall Physical Appearance: nourished  Oral/Mouth Cavity:  (unable to assess)  Skin:  (Robby 14-intact)    Estimated/Assessed Needs  Weight Used For Calorie Calculations: 69.7 kg (153 lb 10.6 oz)  Energy Calorie Requirements (kcal): 7897-2016  Energy Need Method: Kcal/kg (30-35 kcal/kg)  Protein Requirements: 70g (1.0g/kg)  Weight Used For Protein Calculations: 69.7 kg (153 lb 10.6 oz)  Fluid Need Method: RDA Method  RDA Method (mL): 2091     Nutrition Prescription Ordered  Current Diet Order: NPO    Evaluation of Received Nutrient/Fluid Intake  Energy Calories Required: not meeting needs  Protein Required: not meeting needs  Fluid Required: not meeting needs  Comments: LBM " unknown  % Intake of Estimated Energy Needs: Other: NPO  % Meal Intake: NPO    Nutrition Risk  Level of Risk/Frequency of Follow-up:  (2xweekly)     Assessment and Plan    Respiratory failure    Contributing Nutrition Diagnosis  Inadequate energy intake    Related to (etiology):   S/p drug overdose, just extubated    Signs and Symptoms (as evidenced by):   NPO    Interventions/Recommendations (treatment strategy):  See recs    Nutrition Diagnosis Status:   New             Monitor and Evaluation  Food and Nutrient Intake: food and beverage intake  Food and Nutrient Adminstration: diet order  Physical Activity and Function: nutrition-related ADLs and IADLs  Anthropometric Measurements: weight  Biochemical Data, Medical Tests and Procedures: electrolyte and renal panel  Nutrition-Focused Physical Findings: overall appearance     Nutrition Follow-Up  RD Follow-up?: Yes

## 2018-07-16 NOTE — PROGRESS NOTES
Pulmonary & Critical Care Medicine Progress Note    Subjective:   No issues overnight. This AM. He is awake and able to follow commands. Attempting to wean sedation.     Vital Signs:   Vitals:    07/16/18 0730   BP:    Pulse: 70   Resp: (!) 30   Temp:      Fluid Balance:     Intake/Output Summary (Last 24 hours) at 07/16/18 0842  Last data filed at 07/16/18 0800   Gross per 24 hour   Intake          2229.79 ml   Output             2650 ml   Net          -420.21 ml     Physical Exam:   General: NAD, awake, able to follow commands  HEENT: AT/NC, PERRL, oral mucosa moist.   Neck: Supple without JVD or palpable LAD.    Cardiac: S1S2 with brisk cap refill in distal extremities.  Respiratory: Lungs clear bilaterally with no increased work of breathing.   Abdomen: Soft, NT/ND. +BS.   Extremities: No edema.   Neuro: Grossly intact to brief exam.    Laboratory Studies:     Recent Labs  Lab 07/16/18  0500   PH 7.482*   PCO2 35.4   PO2 145*   HCO3 26.5   POCSATURATED 99   BE 3     No results for input(s): WBC, RBC, HGB, HCT, PLT, MCV, MCH, MCHC in the last 24 hours.    Recent Labs  Lab 07/16/18  0400   *   K 4.0   *   CO2 26   BUN 11   CREATININE 0.8   MG 1.8     Microbiology Data:   Microbiology Results (last 7 days)     Procedure Component Value Units Date/Time    Blood culture x two cultures. Draw prior to antibiotics. [[id number]] Collected:  07/14/18 0738    Order Status:  Completed Specimen:  Blood from Peripheral, Forearm, Right Updated:  07/16/18 0828     Blood Culture, Routine Gram stain aer bottle: Gram positive cocci in clusters resembling Staph     Blood Culture, Routine Results called to and read back by: Martin Garcia RN 07/15/2018  10:45    Narrative:       Aerobic and anaerobic    Blood culture x two cultures. Draw prior to antibiotics. [[id number]] Collected:  07/14/18 0847    Order Status:  Completed Specimen:  Blood from Peripheral, Forearm, Left Updated:  07/15/18 1422     Blood Culture, Routine No  Growth to date     Blood Culture, Routine No Growth to date    Narrative:       Aerobic and anaerobic    Culture, Respiratory with Gram Stain [[id number]] Collected:  07/14/18 1700    Order Status:  Completed Specimen:  Respiratory from Endotracheal Aspirate Updated:  07/15/18 1147     Respiratory Culture No Growth     Gram Stain (Respiratory) <10 epithelial cells per low power field.     Gram Stain (Respiratory) Rare WBC's     Gram Stain (Respiratory) Few Gram positive cocci    Urine culture - High Risk [[id number]] Collected:  07/14/18 0757    Order Status:  Completed Specimen:  Urine from Urine, Catheterized Updated:  07/15/18 1105     Urine Culture, Routine No growth         Chest Imaging:   No new imaging.     Infusions:     dexmedetomidine (PRECEDEX) infusion      fentanyl      norepinephrine bitartrate-D5W 0.15 mcg/kg/min (07/16/18 0800)    propofol 30 mcg/kg/min (07/16/18 0800)     Scheduled Medications:    hydrocortisone sodium succinate  100 mg Intravenous Q8H    white petrolatum-mineral oil   Both Eyes Q8H     PRN Medications:   dextrose 50%, dextrose 50%, dextrose 50%, glucagon (human recombinant), glucose, glucose, midazolam, sodium chloride 0.9%    Assessment & Plan:   Patient Active Problem List   Diagnosis    Respiratory failure    Polysubstance abuse    Acute respiratory failure with hypoxia and hypercarbia    Shock     This is a case of cardiogenic shock in the setting of ecstasy and drug abuse.    Neuro:  -RASS 0,-1  -Sedated with Fentanyl and Propofol will wean off in preparation for SAT/SBT this AM; if goal RASS during SBT not able to be achieved, will add precedex  -Stopped Nimbex.  -Utox (+) for opiates and marijuana.     Cardio:  -Shock likely 2/2 new cardiomyopathy; ?underlying cause  -On Levophed but being weaned  -Lactate 2.8  -Hydrocortisone 100 mg IV q8h being continued for now; will plan to d/c   -Trop down trending, peaked at 1.4    Pulm:  -Cardiogenic vs noncardiogenic  pulmonary edema  -Likely cardiogenic given patient's new LVEF of 20%  -Nimbex is now off as patient's sats are in the high 90's.  -Maintain O2 sats >88% or paO2 >55.     Renal:  -+1.5L positive since admision  -UOP adequate  -Bun/Cr improving    ID:  -Afebrile, WBC 10 with neutrophilia  -Lactate 8.0 --> 2.8  -Procal 12.87 (in the setting of renal dysfunction)  -UA clean  -NGTD  -Respiratory culture pending.  -can likely d/c abx    Thank you for involving us in the care of this patient. We will continue to follow along. Please call with any questions.    Ramírez Sweet  [facility] PCCM Fellow, PGY 4  [facility]  My cell: [phone]

## 2018-07-16 NOTE — ASSESSMENT & PLAN NOTE
Contributing Nutrition Diagnosis  Inadequate energy intake    Related to (etiology):   S/p drug overdose, just extubated    Signs and Symptoms (as evidenced by):   NPO    Interventions/Recommendations (treatment strategy):  See recs    Nutrition Diagnosis Status:   New

## 2018-07-16 NOTE — HOSPITAL COURSE
07/16/2018 SBT in process. Patient awake and alert, responding appropriately to simple commands. SB with occasional junctional bradycardia on tele. Continues to require levo for pressure support. Troponin trending down at this time.   07/17/2018 Extubated yesterday. Hemodynamically stable. SB as low as 42 on tele review to ST 110s. BP improved 100s-120s. ACEI initiated. No further junctional bradycardia noted. LFTs continue to improve.   07/18/2018 Patient hemodynamically stable. HR 40s-60s. SBP 110s. Appropriate for discharge to follow up with cardiology.

## 2018-07-16 NOTE — HPI
22 y/o male with no significant PMH present non responsive. Unable to get history as patient is intubated to protect his airways. As per medical records patient snort ectasy and inhale a cleaning substance. He had tachycardia on presentation with hypotension. Cardiology consulted as troponin is elevated. Echo reviewed at bedside showed ef 25% with elevated right side pressures.

## 2018-07-16 NOTE — PLAN OF CARE
Problem: Patient Care Overview  Goal: Plan of Care Review  Outcome: Ongoing (interventions implemented as appropriate)  Patient is progressing toward goals. Pt can follow simple commands. Bilateral soft wrists restraints on to prevent pulling of lines. [facility]. No signs of pressure ulcer. Temp max of 99.4 ax. Landers patent and draining clear, yellow urine. Family at bedside. Bed in low position. Call light within reach. Will continue to monitor.

## 2018-07-16 NOTE — PLAN OF CARE
Problem: Patient Care Overview  Goal: Plan of Care Review  Outcome: Ongoing (interventions implemented as appropriate)  Recommendation/Intervention:   1. When medically acceptable, consult ST for swallow eval.     Goals:   Diet will be started witin 24-48 hours  Nutrition Goal Status: new  Communication of RD Recs: discussed on rounds

## 2018-07-17 PROBLEM — R41.82 ALTERED MENTAL STATUS: Status: ACTIVE | Noted: 2018-07-17

## 2018-07-17 PROBLEM — I49.3 VENTRICULAR ESCAPE RHYTHM: Status: ACTIVE | Noted: 2018-07-17

## 2018-07-17 LAB
ALBUMIN SERPL BCP-MCNC: 2.8 G/DL
ALP SERPL-CCNC: 67 U/L
ALT SERPL W/O P-5'-P-CCNC: 39 U/L
ANION GAP SERPL CALC-SCNC: 8 MMOL/L
AST SERPL-CCNC: 41 U/L
BACTERIA BLD CULT: NORMAL
BASOPHILS # BLD AUTO: 0.02 K/UL
BASOPHILS NFR BLD: 0.2 %
BILIRUB SERPL-MCNC: 2.1 MG/DL
BUN SERPL-MCNC: 16 MG/DL
CALCIUM SERPL-MCNC: 8.7 MG/DL
CHLORIDE SERPL-SCNC: 113 MMOL/L
CK SERPL-CCNC: 366 U/L
CO2 SERPL-SCNC: 26 MMOL/L
CREAT SERPL-MCNC: 0.7 MG/DL
DIFFERENTIAL METHOD: ABNORMAL
EOSINOPHIL # BLD AUTO: 0 K/UL
EOSINOPHIL NFR BLD: 0.2 %
ERYTHROCYTE [DISTWIDTH] IN BLOOD BY AUTOMATED COUNT: 13 %
EST. GFR  (AFRICAN AMERICAN): >60 ML/MIN/1.73 M^2
EST. GFR  (NON AFRICAN AMERICAN): >60 ML/MIN/1.73 M^2
GLUCOSE SERPL-MCNC: 101 MG/DL
HCT VFR BLD AUTO: 40.3 %
HGB BLD-MCNC: 13.5 G/DL
LYMPHOCYTES # BLD AUTO: 2 K/UL
LYMPHOCYTES NFR BLD: 17.1 %
MAGNESIUM SERPL-MCNC: 1.9 MG/DL
MCH RBC QN AUTO: 29.9 PG
MCHC RBC AUTO-ENTMCNC: 33.5 G/DL
MCV RBC AUTO: 89 FL
MONOCYTES # BLD AUTO: 0.8 K/UL
MONOCYTES NFR BLD: 6.3 %
NEUTROPHILS # BLD AUTO: 9 K/UL
NEUTROPHILS NFR BLD: 76.2 %
PHOSPHATE SERPL-MCNC: 3.3 MG/DL
PLATELET # BLD AUTO: 123 K/UL
PLATELET BLD QL SMEAR: ABNORMAL
PMV BLD AUTO: 10.5 FL
POTASSIUM SERPL-SCNC: 3.9 MMOL/L
PROT SERPL-MCNC: 5.7 G/DL
RBC # BLD AUTO: 4.52 M/UL
SODIUM SERPL-SCNC: 147 MMOL/L
WBC # BLD AUTO: 11.85 K/UL

## 2018-07-17 PROCEDURE — 97535 SELF CARE MNGMENT TRAINING: CPT

## 2018-07-17 PROCEDURE — 94761 N-INVAS EAR/PLS OXIMETRY MLT: CPT

## 2018-07-17 PROCEDURE — 92610 EVALUATE SWALLOWING FUNCTION: CPT

## 2018-07-17 PROCEDURE — 82550 ASSAY OF CK (CPK): CPT

## 2018-07-17 PROCEDURE — 80053 COMPREHEN METABOLIC PANEL: CPT

## 2018-07-17 PROCEDURE — G8998 SWALLOW D/C STATUS: HCPCS | Mod: CH

## 2018-07-17 PROCEDURE — 25000003 PHARM REV CODE 250: Performed by: STUDENT IN AN ORGANIZED HEALTH CARE EDUCATION/TRAINING PROGRAM

## 2018-07-17 PROCEDURE — 86592 SYPHILIS TEST NON-TREP QUAL: CPT

## 2018-07-17 PROCEDURE — 63600175 PHARM REV CODE 636 W HCPCS: Performed by: STUDENT IN AN ORGANIZED HEALTH CARE EDUCATION/TRAINING PROGRAM

## 2018-07-17 PROCEDURE — 97161 PT EVAL LOW COMPLEX 20 MIN: CPT

## 2018-07-17 PROCEDURE — 25000003 PHARM REV CODE 250: Performed by: NURSE PRACTITIONER

## 2018-07-17 PROCEDURE — 21400001 HC TELEMETRY ROOM

## 2018-07-17 PROCEDURE — 36415 COLL VENOUS BLD VENIPUNCTURE: CPT

## 2018-07-17 PROCEDURE — G8989 SELF CARE D/C STATUS: HCPCS | Mod: CH

## 2018-07-17 PROCEDURE — 97530 THERAPEUTIC ACTIVITIES: CPT

## 2018-07-17 PROCEDURE — 84100 ASSAY OF PHOSPHORUS: CPT

## 2018-07-17 PROCEDURE — 85025 COMPLETE CBC W/AUTO DIFF WBC: CPT

## 2018-07-17 PROCEDURE — 97165 OT EVAL LOW COMPLEX 30 MIN: CPT

## 2018-07-17 PROCEDURE — G8987 SELF CARE CURRENT STATUS: HCPCS | Mod: CH

## 2018-07-17 PROCEDURE — G8996 SWALLOW CURRENT STATUS: HCPCS | Mod: CH

## 2018-07-17 PROCEDURE — 99232 SBSQ HOSP IP/OBS MODERATE 35: CPT | Mod: ,,, | Performed by: INTERNAL MEDICINE

## 2018-07-17 PROCEDURE — 86703 HIV-1/HIV-2 1 RESULT ANTBDY: CPT

## 2018-07-17 PROCEDURE — 83735 ASSAY OF MAGNESIUM: CPT

## 2018-07-17 PROCEDURE — 99233 SBSQ HOSP IP/OBS HIGH 50: CPT | Mod: ,,, | Performed by: NURSE PRACTITIONER

## 2018-07-17 PROCEDURE — 63600175 PHARM REV CODE 636 W HCPCS

## 2018-07-17 PROCEDURE — G8997 SWALLOW GOAL STATUS: HCPCS | Mod: CH

## 2018-07-17 RX ORDER — MAGNESIUM SULFATE HEPTAHYDRATE 40 MG/ML
2 INJECTION, SOLUTION INTRAVENOUS ONCE
Status: DISCONTINUED | OUTPATIENT
Start: 2018-07-17 | End: 2018-07-17

## 2018-07-17 RX ORDER — FUROSEMIDE 10 MG/ML
20 INJECTION INTRAMUSCULAR; INTRAVENOUS ONCE
Status: COMPLETED | OUTPATIENT
Start: 2018-07-17 | End: 2018-07-17

## 2018-07-17 RX ORDER — LISINOPRIL 2.5 MG/1
2.5 TABLET ORAL DAILY
Status: DISCONTINUED | OUTPATIENT
Start: 2018-07-17 | End: 2018-07-18 | Stop reason: HOSPADM

## 2018-07-17 RX ORDER — FUROSEMIDE 10 MG/ML
20 INJECTION INTRAMUSCULAR; INTRAVENOUS DAILY
Status: DISCONTINUED | OUTPATIENT
Start: 2018-07-17 | End: 2018-07-17

## 2018-07-17 RX ORDER — ENOXAPARIN SODIUM 100 MG/ML
40 INJECTION SUBCUTANEOUS EVERY 24 HOURS
Status: DISCONTINUED | OUTPATIENT
Start: 2018-07-17 | End: 2018-07-17

## 2018-07-17 RX ADMIN — HYDROCORTISONE SODIUM SUCCINATE 100 MG: 100 INJECTION, POWDER, FOR SOLUTION INTRAMUSCULAR; INTRAVENOUS at 05:07

## 2018-07-17 RX ADMIN — FUROSEMIDE 20 MG: 10 INJECTION, SOLUTION INTRAMUSCULAR; INTRAVENOUS at 12:07

## 2018-07-17 RX ADMIN — MAGNESIUM SULFATE HEPTAHYDRATE 2 G: 500 INJECTION, SOLUTION INTRAMUSCULAR; INTRAVENOUS at 06:07

## 2018-07-17 RX ADMIN — LISINOPRIL 2.5 MG: 2.5 TABLET ORAL at 09:07

## 2018-07-17 NOTE — PROGRESS NOTES
[facility]  Cardiology  Progress Note    Patient Name: Eliu Herrera  MRN: [id number]  Admission Date: 7/14/2018  Hospital Length of Stay: 3 days  Code Status: Full Code   Attending Physician: Severyn Yaroshevsky, MD   Primary Care Physician: Primary Doctor No  Expected Discharge Date:   Principal Problem:Acute respiratory failure with hypoxia and hypercarbia    Subjective:     Hospital Course:   07/16/2018 SBT in process. Patient awake and alert, responding appropriately to simple commands. SB with occasional junctional bradycardia on tele. Continues to require levo for pressure support. Troponin trending down at this time.   07/17/2018 Extubated yesterday. Hemodynamically stable. SB as low as 42 on tele review to ST 110s. BP improved 100s-120s. ACEI initiated. No further junctional bradycardia noted. LFTs continue to improve.         Review of Systems   Constitution: Negative for diaphoresis, weakness and malaise/fatigue.   HENT: Negative.    Eyes: Negative.    Cardiovascular: Negative for chest pain, claudication, dyspnea on exertion, irregular heartbeat, leg swelling, near-syncope, orthopnea, palpitations, paroxysmal nocturnal dyspnea and syncope.   Respiratory: Negative.  Negative for cough, shortness of breath, sputum production and wheezing.    Endocrine: Negative.    Hematologic/Lymphatic: Negative.    Skin: Negative.    Musculoskeletal: Negative.    Gastrointestinal: Negative.    Genitourinary: Negative.    Neurological: Negative.    Psychiatric/Behavioral: Negative.    Allergic/Immunologic: Negative.      Objective:     Vital Signs (Most Recent):  Temp: 98.2 °F (36.8 °C) (07/17/18 0703)  Pulse: (!) 47 (07/17/18 0754)  Resp: 18 (07/17/18 0754)  BP: 122/62 (07/17/18 0705)  SpO2: (!) 89 % (07/17/18 0754) Vital Signs (24h Range):  Temp:  [98.1 °F (36.7 °C)-99 °F (37.2 °C)] 98.2 °F (36.8 °C)  Pulse:  [] 47  Resp:  [9-50] 18  SpO2:  [89 %-99 %] 89 %  BP: (105-124)/(50-86) 122/62  Arterial  Line BP: ()/(53-78) 137/70     Weight: 69.4 kg (153 lb)  Body mass index is 23.26 kg/m².     SpO2: (!) 89 %  O2 Device (Oxygen Therapy): room air      Intake/Output Summary (Last 24 hours) at 07/17/18 0859  Last data filed at 07/17/18 0635   Gross per 24 hour   Intake           560.01 ml   Output             1080 ml   Net          -519.99 ml       Lines/Drains/Airways     Peripheral Intravenous Line                 Peripheral IV - Single Lumen 07/14/18 0720 Left Wrist 3 days         Peripheral IV - Single Lumen 07/14/18 0737 Right Forearm 3 days         Peripheral IV - Single Lumen 07/14/18 0846 Left Forearm 3 days                Physical Exam   Constitutional: He is oriented to person, place, and time. He appears well-developed and well-nourished. No distress.   HENT:   Head: Normocephalic.   Eyes: Right eye exhibits no discharge. Left eye exhibits no discharge.   Neck: No JVD present.   Cardiovascular: Normal rate, regular rhythm and normal heart sounds.  Exam reveals no gallop and no friction rub.    No murmur heard.  Pulmonary/Chest: Effort normal and breath sounds normal.   Abdominal: Soft. Bowel sounds are normal.   Musculoskeletal: He exhibits no edema.   Neurological: He is alert and oriented to person, place, and time.   Skin: Skin is warm and dry. He is not diaphoretic.   Psychiatric: He has a normal mood and affect. His behavior is normal. Judgment and thought content normal.       Significant Labs:   Blood Culture: No results for input(s): LABBLOO in the last 48 hours., BMP:     Recent Labs  Lab 07/15/18  1404 07/16/18  0400 07/17/18  0505   * 127* 101    146* 147*   K 4.3 4.0 3.9   * 114* 113*   CO2 24 26 26   BUN 9 11 16   CREATININE 1.0 0.8 0.7   CALCIUM 8.4* 8.7 8.7   MG  --  1.8 1.9   , CMP     Recent Labs  Lab 07/15/18  1404 07/16/18  0400 07/17/18  0505    146* 147*   K 4.3 4.0 3.9   * 114* 113*   CO2 24 26 26   * 127* 101   BUN 9 11 16   CREATININE 1.0  0.8 0.7   CALCIUM 8.4* 8.7 8.7   PROT  --  5.3* 5.7*   ALBUMIN  --  2.7* 2.8*   BILITOT  --  2.2* 2.1*   ALKPHOS  --  61 67   AST  --  64* 41*   ALT  --  48* 39   ANIONGAP 6* 6* 8   ESTGFRAFRICA >60 >60 >60   EGFRNONAA >60 >60 >60   , CBC No results for input(s): WBC, HGB, HCT, PLT in the last 48 hours., INR No results for input(s): INR, PROTIME in the last 48 hours., Lipid Panel No results for input(s): CHOL, HDL, LDLCALC, TRIG, CHOLHDL in the last 48 hours., Troponin No results for input(s): TROPONINI in the last 48 hours. and All pertinent lab results from the last 24 hours have been reviewed.    Significant Imaging: Echocardiogram:   2D echo with color flow doppler:   Results for orders placed or performed during the hospital encounter of 07/14/18   2D echo with color flow doppler   Result Value Ref Range    EF 25 (A) 55 - 65    Diastolic Dysfunction Yes (A)     Est. PA Systolic Pressure 33.15     Mitral Valve Mobility NORMAL     Tricuspid Valve Regurgitation MILD      Assessment and Plan:     Brief HPI: Patient seen this morning on rounds without cardiac complaint. POC discussed with initiation of ACEI.     Ventricular escape rhythm    Intermittent on 07/16/2018; likely in setting of polysubstance abuse and resolved with washout          Acute systolic heart failure    LVEF 20-25% per echo   Euvolemic on exam   Intermittent junctional rhythm on tele review- resolved    ACEI initiated this AM, Holding off on BB given bradycardia in the low 40s. Anticipate initiation prior to discharge     NSTEMI - demand in setting of polysubstance abuse with respiratory failure    FLAKITO resolved and  shock liver improving     560 cc intake 1150 cc output over past 24 hours    Repeat echo 90D post GDMT initiation to re-eval LVEF           Shock    2/2 polysubstance abuse with out of hospital arrest, improved             VTE Risk Mitigation         Ordered     IP VTE LOW RISK PATIENT  Once      07/14/18 1152     Place sequential  compression device  Until discontinued      07/14/18 3991          Que Moise NP  Cardiology  [facility]

## 2018-07-17 NOTE — PLAN OF CARE
Problem: Patient Care Overview  Goal: Plan of Care Review  Outcome: Ongoing (interventions implemented as appropriate)  Patient is progressing toward goals. AAO. On 4 L O2 NC. No signs of pressure ulcer. Max temp 99F orally. Family at bedside. Remains free from fall/injury. Bed in low position. Call light within reach. Will continue to monitor.

## 2018-07-17 NOTE — ASSESSMENT & PLAN NOTE
LVEF 20-25% per echo   Euvolemic on exam   Intermittent junctional rhythm on tele review- resolved    ACEI initiated this AM, Holding off on BB given bradycardia in the low 40s. Anticipate initiation prior to discharge     NSTEMI - demand in setting of polysubstance abuse with respiratory failure    FLAKITO resolved and  shock liver improving     560 cc intake 1150 cc output over past 24 hours    Repeat echo 90D post GDMT initiation to re-eval LVEF

## 2018-07-17 NOTE — PLAN OF CARE
Problem: Occupational Therapy Goal  Goal: Occupational Therapy Goal  Outcome: Outcome(s) achieved Date Met: 07/17/18  OT eval/tx completed this date. Pt lives w/ parents in [place] w/ 0STE; T/S combo. 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 Currently, pt able to perf all fxnl mobility, fxnl t/f's & BADLs w/ Sup-Mod (I) & sat'ing at 94% on RA after 30 min eval/tx tasks. Pt left up in b/s chair. Edu/tx re: HEP & deep breathing techs. Pt/fly verbalized understanding.     Per eval, no further OT svcs indicated at this time. D/C home w/ no DME recs.

## 2018-07-17 NOTE — PLAN OF CARE
Problem: SLP Goal  Goal: SLP Goal  Short Term Goals:  1. Pt will participate in ongoing swallow assessment to determine least restrictive diet.   Outcome: Outcome(s) achieved Date Met: 07/17/18  Clinical swallow eval completed, see report for details. Pt safe for regular diet and thin liquids.

## 2018-07-17 NOTE — PLAN OF CARE
Problem: Physical Therapy Goal  Goal: Physical Therapy Goal  Goals to be met by: 2018     Patient will increase functional independence with mobility by performin. Patient will demonstrate knowledge of symptoms of overexertion/rate of perceived exertion.  2. Patient will demonstrate knowledge of pursed lip breathing.  3. Patient will demonstrate knowledge of home walking exercise program     Outcome: Ongoing (interventions implemented as appropriate)  Patient evaluated; supervision/Laquita with all transfers/tasks; recommend OP cardiac rehab as cardiologist deems necessary; no equipment needed for home use.

## 2018-07-17 NOTE — PT/OT/SLP EVAL
Occupational Therapy   Evaluation and Discharge Note    Name: Eliu Herrera  MRN: [id number]  Admitting Diagnosis:  Acute respiratory failure with hypoxia and hypercarbia      Recommendations:     Discharge Recommendations: home  Discharge Equipment Recommendations:  none  Barriers to discharge:  None    History:     Occupational Profile:  Living Environment: w/ parents in [place] w/ 0STE; T/S combo  Previous level of function: (I)  Roles and Routines: drives; works full time  Equipment Owned:  none  Assistance upon Discharge: PRN S/A    History reviewed. No pertinent past medical history.    History reviewed. No pertinent surgical history.    Subjective     Chief Complaint: resp failure  Patient/Family stated goals: return to PLOF  Communicated with: nsg prior to session.  Pain/Comfort:  · Pain Rating 1: 0/10  · Pain Rating Post-Intervention 1: 0/10    Patients cultural, spiritual, Sabianism conflicts given the current situation:      Objective:     Patient found with: telemetry, oxygen    General Precautions: Standard, fall   Orthopedic Precautions:N/A   Braces: N/A     Occupational Performance:    Bed Mobility:    · Patient completed Supine to Sit with modified independence    Functional Mobility/Transfers:  · Patient completed Sit <> Stand Transfer with supervision  with  no assistive device   · Patient completed Bed <> Chair Transfer using Step Transfer technique with supervision with no assistive device  · Patient completed Toilet Transfer Step Transfer technique with supervision with  no AD  · Functional Mobility: w/o DME around room for ADLs w/ Sup    Activities of Daily Living:  · Grooming: modified independence standing at sink  · Lower Body Dressing: supervision EOB  · Toileting: supervision on toilet    Cognitive/Visual Perceptual:  Grossly WFL    Physical Exam:  B UEs WFL at 4/5    Sit balance: F+  Stand balance: F+    Patient left up in chair with all lines intact, call button in reach, nsg notified and fly  "present    [facility] 6 Click:  [facility] Total Score: 24    Treatment & Education:  OT eval/tx completed this date. Pt lives w/ parents in [place] w/ 0STE; T/S combo. 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 Currently, pt able to perf all fxnl mobility, fxnl t/f's & BADLs w/ Sup-Mod (I) & sat'ing at 94% on RA after 30 min eval/tx tasks. Pt left up in b/s chair. Edu/tx re: HEP & deep breathing techs. Pt/fly verbalized understanding.     Education:    Assessment:    Per eval, no further OT svcs indicated at this time. D/C home w/ no DME recs.    Eliu Herrera is a 23 y.o. male with a medical diagnosis of Acute respiratory failure with hypoxia and hypercarbia. At this time, patient is functioning at their prior level of function and does not require further acute OT services.     Clinical Decision Makin.  OT Low:  "Pt evaluation falls under low complexity for evaluation coding due to performance deficits noted in 1-3 areas as stated above and 0 co-morbities affecting current functional status. Data obtained from problem focused assessments. No modifications or assistance was required for completion of evaluation. Only brief occupational profile and history review completed."     Plan:     During this hospitalization, patient does not require further acute OT services.  Please re-consult if situation changes.    · Plan of Care Reviewed with: patient, family    This Plan of care has been discussed with the patient who was involved in its development and understands and is in agreement with the identified goals and treatment plan    GOALS:    Occupational Therapy Goals        Problem: Occupational Therapy Goal    Goal Priority Disciplines Outcome Interventions   Occupational Therapy Goal     OT, PT/OT                     Time Tracking:     OT Date of Treatment: 18  OT Start Time: 1509  OT Stop Time: 1532  OT Total Time (min): 23 min    Billable Minutes:Evaluation " 10  Self Care/Home Management 13    TIMOTHY Tan  7/17/2018

## 2018-07-17 NOTE — PT/OT/SLP EVAL
"Speech Language Pathology Evaluation  Bedside Swallow  Discharge    Patient Name:  Eliu Herrera   MRN:  [id number]  Admitting Diagnosis: Acute respiratory failure with hypoxia and hypercarbia    Recommendations:     Diet recommendations:  Regular, Thin   Aspiration Precautions: upright for meals, whole meds, straws ok, may feed self   General Precautions: Standard, fall  Communication strategies:  Wolof speaking, fluent in english, native language is Kiswahili.   SLP did offer pt free of charge  services prior to eval. Pt declined free of charge  services. Pt cited he was comfortable with SLP speaking in his native language of Kiswahili.   No family or friend at bedside.         History:   History of Present Illness:   Patient is a 23 y.o. male admitted after a toxic ingestion. The patient was intubated upon exam and unable to provide any information.  Per review of medical record and EMS, the patient has no significant pmhx, medications, or allergies.   Drugs thought to be ingested include: possible ecstasy and urine tox screen was positive for opioids and THC. Approximately last seen at 12 am without being altered. In the ED, the patient was treated with Narcan with little improvement. Upon presentation in the ED, the patient was unresponsive and unable to protect his airway; therefore, the patient was intubated.  History reviewed. No pertinent past medical history.    History reviewed. No pertinent surgical history.    Social History: Patient lives with at home, indep with ADLs, works full time.    Prior Intubation HX:  SLP consulted, pt extubated on 7/16    Modified Barium Swallow: none per EMR    Chest X-Rays: Diffuse hazy airspace disease    Prior diet: reg/thin liquids    Occupation/hobbies/homemaking: works full time    Subjective     Consult received for clinical swallow eval this date, SLP did communicate with RN prior to eval/treat.    Patient goals: "Yo ya comi algo, yo quiero frutas." " ""I already ate something and I want fruit."    Pain/Comfort:  · Pain Rating 1: 0/10    Objective:     SLP consulted at bedside in ICU for swallow eval. Pt found in room, pt reported he had already had oral diet. RN at bedside reported no issues with meds or PO intake.  Pt with family in room conversing.     Oral Musculature Evaluation  · Oral Musculature: WFL  · Dentition: present and adequate  · Mucosal Quality: good, adequate  · Mandibular Strength and Mobility: WFL  · Oral Labial Strength and Mobility: WFL  · Lingual Strength and Mobility: WFL  · Buccal Strength and Mobility: WFL  · Volitional Cough: elicited  · Volitional Swallow: timely swallow  · Voice Prior to PO Intake: clear voice    Bedside Swallow Eval:   Consistencies Assessed:  · Thin liquids water self fed and by straw  · Puree pudding self fed  · Solids cracker self fed     Oral Phase:   · WFL   · Timely mastication    Pharyngeal Phase:   · no overt clinical signs/symptoms of aspiration  · no overt clinical signs/symptoms of pharyngeal dysphagia  · multiple spontaneous swallows   · No wet voice and voice is clear post consecutive swallows of liquids    Compensatory Strategies  · Multiple swallows   · Slow rate of intake and alternate sips and bites    Treatment: no further ST needs indicated at this time    Assessment:     Eliu Herrera is a 23 y.o. male admitted with drug overdose who presents with functional oral and pharyngeal phase of the swallow. No further ST needs indicated.       Goals:    SLP Goals     Not on file          Multidisciplinary Problems (Resolved)        Problem: SLP Goal    Goal Priority Disciplines Outcome   SLP Goal   (Resolved)     SLP Outcome(s) achieved   Description:  Short Term Goals:  1. Pt will participate in ongoing swallow assessment to determine least restrictive diet.                     Plan:     · Patient to be seen:      · Plan of Care expires:     · Plan of Care reviewed with:  patient   · SLP Follow-Up:  No  "      Discharge recommendations:   (home)     Time Tracking:     SLP Treatment Date:   07/17/18  Speech Start Time:  1122  Speech Stop Time:  1133     Speech Total Time (min):  11 min    Billable Minutes: Eval Swallow and Oral Function 11    JIMMY Mcdonald, CCC-SLP  07/17/2018

## 2018-07-17 NOTE — PT/OT/SLP EVAL
Physical Therapy Evaluation    Patient Name:  Eliu Herrera   MRN:  [id number]    Recommendations:     Discharge Recommendations:  home (OP cardiac rehab as deemed necessary by cardiologist)   Discharge Equipment Recommendations: none   Barriers to discharge: None    Assessment:     Eliu Herrera is a 23 y.o. male admitted with a medical diagnosis of Acute respiratory failure with hypoxia and hypercarbia.  He presents with the following impairments/functional limitations:  impaired cardiopulmonary response to activity Patient evaluated; supervision/Laquita with all transfers/tasks; recommend OP cardiac rehab as cardiologist deems necessary; no equipment needed for home use..    Rehab Prognosis:  good; patient would benefit from acute skilled PT services to address these deficits and reach maximum level of function.      Recent Surgery: * No surgery found *      Plan:     During this hospitalization, patient to be seen 3 x/week to address the above listed problems via therapeutic activities, therapeutic exercises (education)  · Plan of Care Expires:  08/17/18   Plan of Care Reviewed with: patient, family    Subjective     Communicated with nurse prior to session.  Patient found up in chair upon PT entry to room, agreeable to evaluation.      Chief Complaint: none stated  Patient comments/goals: return to PLOF  Pain/Comfort:  · Pain Rating 1: 0/10  · Pain Rating Post-Intervention 1: 0/10    Patients cultural, spiritual, Cheondoism conflicts given the current situation: none    Living Environment:  Patient lives with parents in [facility] w/ 0STE; T/S combo  Previous level of function: (I); drives; works full time as pool plasterer  Equipment Owned:  none  Assistance upon Discharge: PRN S/A    Objective:     Patient found with: telemetry     General Precautions: Standard, fall   Orthopedic Precautions:N/A   Braces: N/A     Exams:  · Cognition WFL  · Follows all multistep commands  · Tactile sensation intact  · BLEs WFL for  active range; 4+ to 5 strength  · Balance: sit static/dynamic~normal; stand static/dynamic~initially fair+; good by end of session    Functional Mobility:  · Bed Mobility: n/a-pt up in chair-see OT  · Transfers:     · Sit to Stand:  modified independence and supervision with no AD; able to SLS >10sec without LOB on 2nd trial; first trial <10 sec with good compensatory movment  · Gait: Pt amb 150ft initially with minimal sway/stagger L/R for ~first 10ft in morales, then progressed to supervision/Laquita with fair pablo and gait mechanics during challenges such as head turns, 360*turns, quick stops    AM-PAC 6 CLICK MOBILITY  Total Score:23       Therapeutic Activities and Exercises:   Activities as described above; educated on role of PT/POC; instructed on sx of overexertion, rate of perceived exertion and activity tolerance, pursed lip breathing for SOB if encountered and for general relaxation; ejection fraction meaning; recovery phase following hospital stay  Initial BP sitting at rest: 115/63 HR 78 O2 sats 92% on RA  Post activity/amb: /59 HR67 O2 sats 94%  After rest x 3-5 minutes:  /62 HR 66 O2 sats 92% on RA  Patient left up in chair with all lines intact, call button in reach, nurse notified and family present.    GOALS:    Physical Therapy Goals        Problem: Physical Therapy Goal    Goal Priority Disciplines Outcome Goal Variances Interventions   Physical Therapy Goal     PT/OT, PT Ongoing (interventions implemented as appropriate)     Description:  Goals to be met by: 2018     Patient will increase functional independence with mobility by performin. Patient will demonstrate knowledge of symptoms of overexertion/rate of perceived exertion.  2. Patient will demonstrate knowledge of pursed lip breathing.  3. Patient will demonstrate knowledge of home walking exercise program  4. Patient will demonstrate Laquita with all activities/mobility                       History:     History  reviewed. No pertinent past medical history.    History reviewed. No pertinent surgical history.    Clinical Decision Making:     History  Co-morbidities and personal factors that may impact the plan of care Examination  Body Structures and Functions, activity limitations and participation restrictions that may impact the plan of care Clinical Presentation   Decision Making/ Complexity Score   Co-morbidities:   [] Time since onset of injury / illness / exacerbation  [x] Status of current condition  []Patient's cognitive status and safety concerns    [] Multiple Medical Problems (see med hx)  Personal Factors:   [] Patient's age  [] Prior Level of function   [] Patient's home situation (environment and family support)  [] Patient's level of motivation  [] Expected progression of patient      HISTORY:(criteria)    [] 92442 - no personal factors/history    [x] 67796 - has 1-2 personal factor/comorbidity     [] 72071 - has >3 personal factor/comorbidity     Body Regions:  [x] Objective examination findings  [] Head     []  Neck  [] Trunk   [] Upper Extremity  [] Lower Extremity    Body Systems:  [] For communication ability, affect, cognition, language, and learning style: the assessment of the ability to make needs known, consciousness, orientation (person, place, and time), expected emotional /behavioral responses, and learning preferences (eg, learning barriers, education  needs)  [x] For the neuromuscular system: a general assessment of gross coordinated movement (eg, balance, gait, locomotion, transfers, and transitions) and motor function  (motor control and motor learning)  [x] For the musculoskeletal system: the assessment of gross symmetry, gross range of motion, gross strength, height, and weight  [] For the integumentary system: the assessment of pliability(texture), presence of scar formation, skin color, and skin integrity  [x] For cardiovascular/pulmonary system: the assessment of heart rate, respiratory  rate, blood pressure, and edema     Activity limitations:    [] Patient's cognitive status and saf ety concerns          [x] Status of current condition      [] Weight bearing restriction  [] Cardiopulmunary Restriction    Participation Restrictions:   [] Goals and goal agreement with the patient     [] Rehab potential (prognosis) and probable outcome      Examination of Body System: (criteria)    [] 70988 - addressing 1-2 elements    [] 86892 - addressing a total of 3 or more elements     [x] 58120 -  Addressing a total of 4 or more elements         Clinical Presentation: (criteria)  Stable - 39104     On examination of body system using standardized tests and measures patient presents with 4 or more elements from any of the following: body structures and functions, activity limitations, and/or participation restrictions.  Leading to a clinical presentation that is considered stable and/or uncomplicated                              Clinical Decision Making  (Eval Complexity):  Low- 57045     Time Tracking:     PT Received On: 07/17/18  PT Start Time: 1535     PT Stop Time: 1603  PT Total Time (min): 28 min     Billable Minutes: Evaluation 10 minutes and Therapeutic Activity 18 minutes      Dacia Funk, PT  07/17/2018

## 2018-07-17 NOTE — PROGRESS NOTES
[facility]  Progress Note    Subjective:       Chief Complaint/Reason for Admission: Drug Overdose    Interval History: Pt extubated and off pressors. Reporting no acute events overnight. States that breathing is improved and is on 2L NC. He is eating ice chips and tolerating well. Denies chest pain, fevers, chills, nausea, and vomiting.     Review of Systems:   All systems other than above negative.      Patient Active Problem List    Diagnosis Date Noted    Ventricular escape rhythm 07/17/2018    Acute systolic heart failure 07/16/2018    Respiratory failure 07/14/2018    Polysubstance abuse     Acute respiratory failure with hypoxia and hypercarbia     Shock      History reviewed. No pertinent past medical history.   History reviewed. No pertinent surgical history.   No prescriptions prior to admission.     Review of patient's allergies indicates:   Allergen Reactions    Penicillins Rash      Social History   Substance Use Topics    Smoking status: Current Every Day Smoker    Smokeless tobacco: Never Used    Alcohol use Not on file      History reviewed. No pertinent family history.       OBJECTIVE:     Vital signs in last 24 hours:  Temp:  [98.1 °F (36.7 °C)-99 °F (37.2 °C)] 98.2 °F (36.8 °C)  Pulse:  [] 47  Resp:  [9-50] 18  SpO2:  [89 %-99 %] 89 %  BP: (105-122)/(50-86) 122/62  Arterial Line BP: ()/(53-78) 137/70    Constitutional: He appears well-developed.   General: The patient is on 2L NC, in no acute distress  HENT:   Neck: supple w/o JVD  Head: Normocephalic and atraumatic.   Eyes: Pupils were symmetric and approximately 6 mm in size.   Neck: No traumatic lesions noted.   Cardiovascular: Sinus rhythm.  Exam reveals no gallop, murmurs, and no friction rub.  Pulmonary/Chest: No increased effort of breathing, Lungs clear to auscultation bilaterally, chest non tender to palpation  Abdominal: Soft. He exhibits no distension. Normoactive bowel sounds  Musculoskeletal: No clubbing, cyanosis,  or edema appreciated; distal pulses intact.    Neurological: Alert & oriented X 3   Skin: Skin is warm and dry. No rashes noted.     Data Review:   CBC:   Lab Results   Component Value Date    WBC 10.56 07/14/2018    RBC 5.21 07/14/2018    HGB 15.8 07/14/2018    HCT 48.2 07/14/2018     07/14/2018     BMP:   Lab Results   Component Value Date     07/17/2018     (H) 07/17/2018    K 3.9 07/17/2018     (H) 07/17/2018    CO2 26 07/17/2018    BUN 16 07/17/2018    CREATININE 0.7 07/17/2018    CALCIUM 8.7 07/17/2018     Coagulation:   Lab Results   Component Value Date    INR 1.3 (H) 07/14/2018    APTT 27.4 07/14/2018     Cardiac markers: No results found for: CKMB, TROPONINT, MYOGLOBIN  ABGs:   Lab Results   Component Value Date    PH 7.482 (H) 07/16/2018    PO2 145 (H) 07/16/2018    PCO2 35.4 07/16/2018     Radiology review:       ASSESSMENT/PLAN:     Active Hospital Problems    Diagnosis  POA    *Acute respiratory failure with hypoxia and hypercarbia [J96.01, J96.02]  Unknown    Ventricular escape rhythm [I49.3]  Unknown    Acute systolic heart failure [I50.21]  Unknown    Respiratory failure [J96.90]  Yes    Polysubstance abuse [F19.10]  Unknown    Shock [R57.9]  Unknown      Resolved Hospital Problems    Diagnosis Date Resolved POA   No resolved problems to display.     Mr. Eliu Herrera w/ no significant PMHx presented to the ED in respiratory failure secondary to possible drug overdose.     1. Cardiogenic Shock  - Unknown cause of cardiogenic shock.  -  Landers in place to monitor I/O, Landers to gravity  -  Norepi (Pressor) discontinued on 7/16 after extubation  - Continue telemetry  - Keep MAP >65 mm Hg  - Keep Mg 2, K 4  - Per Cardiology, will require a BB when more stable  - Lisinopril 2.5mg QD started per Cards rec  - NSTEMI 2/2 demand ischemia mismatch  - EF of 25-30%       2. Respiratory failure  - Has improved  - Blood cxs positive but states it was likely contaminate; decided to  re culture, and restarted Vanco and Zosyn  - Neuro checks q 4 hours  - Continue Pulse Ox  - Resp Cx show C. Albicans, unlikely pathogenic      3. Rhabdomyolysis secondary to drug overdose  -CPK trending down, currently 366  -UOP adequate  -Continue to hydrate and monitor  -Improving     PPX:  -Lovenox    Diet:  -Cardiac diet    Dispo:  - Lytes repeated  - Will transfer to the floor later today  - F/u HIV/RPR  - Placed inpatient psych consult due to drug overdose  - Urine culture and UA ordered  - Blood culture re-ordered  - Resp culture grew Candida albicans, unlikely pathogenic  - Per patient, he would like to add fruit to his diet

## 2018-07-17 NOTE — NURSING
1132 Report called to Carolina BHANDARI on 4th floor, bed called for, will be transported to Room # 456 on bed in NAD, AAO x4, family present.

## 2018-07-17 NOTE — PROGRESS NOTES
Pulmonary & Critical Care Medicine Progress Note    Subjective:   No issues yesterday during the day or overnight following extubation.     Vital Signs:   Vitals:    07/17/18 0754   BP:    Pulse: (!) 47   Resp: 18   Temp:      Fluid Balance:     Intake/Output Summary (Last 24 hours) at 07/17/18 0852  Last data filed at 07/17/18 0635   Gross per 24 hour   Intake           560.01 ml   Output             1080 ml   Net          -519.99 ml     Physical Exam:   General: NAD, awake, able to follow commands  HEENT: AT/NC, PERRL, oral mucosa moist.   Neck: Supple without JVD or palpable LAD.    Cardiac: S1S2 with brisk cap refill in distal extremities.  Respiratory: Lungs clear bilaterally with no increased work of breathing; crackles when pressing on the SCM area of the neck  Abdomen: Soft, NT/ND. +BS.   Extremities: No edema.   Neuro: Grossly intact to brief exam.    Laboratory Studies:   No results for input(s): PH, PCO2, PO2, HCO3, POCSATURATED, BE in the last 24 hours.  No results for input(s): WBC, RBC, HGB, HCT, PLT, MCV, MCH, MCHC in the last 24 hours.    Recent Labs  Lab 07/17/18  0505   *   K 3.9   *   CO2 26   BUN 16   CREATININE 0.7   MG 1.9     Microbiology Data:   Microbiology Results (last 7 days)     Procedure Component Value Units Date/Time    Culture, Respiratory with Gram Stain [[id number]] Collected:  07/14/18 1700    Order Status:  Completed Specimen:  Respiratory from Endotracheal Aspirate Updated:  07/16/18 1503     Respiratory Culture --     CANDIDA ALBICANS  Moderate  Normal respiratory karolina also present       Gram Stain (Respiratory) <10 epithelial cells per low power field.     Gram Stain (Respiratory) Rare WBC's     Gram Stain (Respiratory) Few Gram positive cocci    Blood culture x two cultures. Draw prior to antibiotics. [[id number]] Collected:  07/14/18 0847    Order Status:  Completed Specimen:  Blood from Peripheral, Forearm, Left Updated:  07/16/18 1422     Blood Culture, Routine  No Growth to date     Blood Culture, Routine No Growth to date     Blood Culture, Routine No Growth to date    Narrative:       Aerobic and anaerobic    Blood culture x two cultures. Draw prior to antibiotics. [[id number]] Collected:  07/14/18 0738    Order Status:  Completed Specimen:  Blood from Peripheral, Forearm, Right Updated:  07/16/18 1305     Blood Culture, Routine Gram stain aer bottle: Gram positive cocci in clusters resembling Staph     Blood Culture, Routine Results called to and read back by: Martin Garcia RN 07/15/2018  10:45     Blood Culture, Routine --     COAGULASE-NEGATIVE STAPHYLOCOCCUS SPECIES  Organism is a probable contaminant      Narrative:       Aerobic and anaerobic    Urine culture - High Risk [[id number]] Collected:  07/14/18 0757    Order Status:  Completed Specimen:  Urine from Urine, Catheterized Updated:  07/15/18 1105     Urine Culture, Routine No growth         Chest Imaging:   CXR this AM w/ air in the subQ neck area    Infusions:     dexmedetomidine (PRECEDEX) infusion      norepinephrine bitartrate-D5W Stopped (07/16/18 1100)     Scheduled Medications:    hydrocortisone sodium succinate  100 mg Intravenous Q8H    white petrolatum-mineral oil   Both Eyes Q8H     PRN Medications:   dextrose 50%, dextrose 50%, dextrose 50%, glucagon (human recombinant), glucose, glucose, midazolam, sodium chloride 0.9%    Assessment & Plan:   Patient Active Problem List   Diagnosis    Respiratory failure    Polysubstance abuse    Acute respiratory failure with hypoxia and hypercarbia    Shock    Acute systolic heart failure     This is a case of cardiogenic shock in the setting of ecstasy and drug abuse.    Neuro:  -CAM negative; however still not at baseline and with some inattention     Cardio:  -Shock likely 2/2 new cardiomyopathy; ?underlying cause  -off pressors  -ACEI started by cards for CMPY      Pulm:  -Cardiogenic vs noncardiogenic pulmonary edema  -Likely cardiogenic given  patient's new LVEF of 20%  -Maintain O2 sats >88%  Pneumomediastinum  -continue to monitor; no acute intervention needed       Renal:  -+1.5L positive since admision  -UOP adequate  -Bun/Cr improving    ID:  -Afebrile, WBC 10  -Lactate 8.0 --> 2.8  -Procal 12.87 (in the setting of renal dysfunction)  -UA clean  -BCx w/ CoNS; likely contaminant  -Respiratory culture w/ C. Albicans; very unlikely pathogenic    Thank you for involving us in the care of this patient. We will continue to follow along. Please call with any questions.    Ramírez Sweet  [employer] P[employer]M Fellow, PGY 4  [facility]  My cell: [phone]

## 2018-07-17 NOTE — PLAN OF CARE
Problem: Patient Care Overview  Goal: Plan of Care Review  Outcome: Revised  Pt educated on reduced EF and watching fluid and salt intake. Family remained at bedside all day. Restraints to bilat wrist discontinued after extubation. Pt remains calm and cooperative. Pt able to turn self in bed. Bed alarm on. Call bell in reach. Instructions given to call for assistance. Pt on 4L 02 per NC. Landers intact draining clear yellow fluid. Pt remained afebrile through the day. Pt passed bedside swallow study. Will cont to monitor and will report off to oncoming nurse.

## 2018-07-17 NOTE — SUBJECTIVE & OBJECTIVE
Review of Systems   Constitution: Negative for diaphoresis, weakness and malaise/fatigue.   HENT: Negative.    Eyes: Negative.    Cardiovascular: Negative for chest pain, claudication, dyspnea on exertion, irregular heartbeat, leg swelling, near-syncope, orthopnea, palpitations, paroxysmal nocturnal dyspnea and syncope.   Respiratory: Negative.  Negative for cough, shortness of breath, sputum production and wheezing.    Endocrine: Negative.    Hematologic/Lymphatic: Negative.    Skin: Negative.    Musculoskeletal: Negative.    Gastrointestinal: Negative.    Genitourinary: Negative.    Neurological: Negative.    Psychiatric/Behavioral: Negative.    Allergic/Immunologic: Negative.      Objective:     Vital Signs (Most Recent):  Temp: 98.2 °F (36.8 °C) (07/17/18 0703)  Pulse: (!) 47 (07/17/18 0754)  Resp: 18 (07/17/18 0754)  BP: 122/62 (07/17/18 0705)  SpO2: (!) 89 % (07/17/18 0754) Vital Signs (24h Range):  Temp:  [98.1 °F (36.7 °C)-99 °F (37.2 °C)] 98.2 °F (36.8 °C)  Pulse:  [] 47  Resp:  [9-50] 18  SpO2:  [89 %-99 %] 89 %  BP: (105-124)/(50-86) 122/62  Arterial Line BP: ()/(53-78) 137/70     Weight: 69.4 kg (153 lb)  Body mass index is 23.26 kg/m².     SpO2: (!) 89 %  O2 Device (Oxygen Therapy): room air      Intake/Output Summary (Last 24 hours) at 07/17/18 0859  Last data filed at 07/17/18 0635   Gross per 24 hour   Intake           560.01 ml   Output             1080 ml   Net          -519.99 ml       Lines/Drains/Airways     Peripheral Intravenous Line                 Peripheral IV - Single Lumen 07/14/18 0720 Left Wrist 3 days         Peripheral IV - Single Lumen 07/14/18 0737 Right Forearm 3 days         Peripheral IV - Single Lumen 07/14/18 0846 Left Forearm 3 days                Physical Exam   Constitutional: He is oriented to person, place, and time. He appears well-developed and well-nourished. No distress.   HENT:   Head: Normocephalic.   Eyes: Right eye exhibits no discharge. Left eye  exhibits no discharge.   Neck: No JVD present.   Cardiovascular: Normal rate, regular rhythm and normal heart sounds.  Exam reveals no gallop and no friction rub.    No murmur heard.  Pulmonary/Chest: Effort normal and breath sounds normal.   Abdominal: Soft. Bowel sounds are normal.   Musculoskeletal: He exhibits no edema.   Neurological: He is alert and oriented to person, place, and time.   Skin: Skin is warm and dry. He is not diaphoretic.   Psychiatric: He has a normal mood and affect. His behavior is normal. Judgment and thought content normal.       Significant Labs:   Blood Culture: No results for input(s): LABBLOO in the last 48 hours., BMP:     Recent Labs  Lab 07/15/18  1404 07/16/18  0400 07/17/18  0505   * 127* 101    146* 147*   K 4.3 4.0 3.9   * 114* 113*   CO2 24 26 26   BUN 9 11 16   CREATININE 1.0 0.8 0.7   CALCIUM 8.4* 8.7 8.7   MG  --  1.8 1.9   , CMP     Recent Labs  Lab 07/15/18  1404 07/16/18  0400 07/17/18  0505    146* 147*   K 4.3 4.0 3.9   * 114* 113*   CO2 24 26 26   * 127* 101   BUN 9 11 16   CREATININE 1.0 0.8 0.7   CALCIUM 8.4* 8.7 8.7   PROT  --  5.3* 5.7*   ALBUMIN  --  2.7* 2.8*   BILITOT  --  2.2* 2.1*   ALKPHOS  --  61 67   AST  --  64* 41*   ALT  --  48* 39   ANIONGAP 6* 6* 8   ESTGFRAFRICA >60 >60 >60   EGFRNONAA >60 >60 >60   , CBC No results for input(s): WBC, HGB, HCT, PLT in the last 48 hours., INR No results for input(s): INR, PROTIME in the last 48 hours., Lipid Panel No results for input(s): CHOL, HDL, LDLCALC, TRIG, CHOLHDL in the last 48 hours., Troponin No results for input(s): TROPONINI in the last 48 hours. and All pertinent lab results from the last 24 hours have been reviewed.    Significant Imaging: Echocardiogram:   2D echo with color flow doppler:   Results for orders placed or performed during the hospital encounter of 07/14/18   2D echo with color flow doppler   Result Value Ref Range    EF 25 (A) 55 - 65    Diastolic  Dysfunction Yes (A)     Est. PA Systolic Pressure 33.15     Mitral Valve Mobility NORMAL     Tricuspid Valve Regurgitation MILD

## 2018-07-18 VITALS
BODY MASS INDEX: 23.19 KG/M2 | SYSTOLIC BLOOD PRESSURE: 121 MMHG | OXYGEN SATURATION: 100 % | HEIGHT: 68 IN | RESPIRATION RATE: 18 BRPM | WEIGHT: 153 LBS | HEART RATE: 55 BPM | DIASTOLIC BLOOD PRESSURE: 67 MMHG | TEMPERATURE: 99 F

## 2018-07-18 LAB
ALBUMIN SERPL BCP-MCNC: 2.9 G/DL
ALBUMIN SERPL BCP-MCNC: 3 G/DL
ALP SERPL-CCNC: 61 U/L
ALP SERPL-CCNC: 61 U/L
ALT SERPL W/O P-5'-P-CCNC: 36 U/L
ALT SERPL W/O P-5'-P-CCNC: 38 U/L
ANION GAP SERPL CALC-SCNC: 6 MMOL/L
ANION GAP SERPL CALC-SCNC: 8 MMOL/L
AST SERPL-CCNC: 27 U/L
AST SERPL-CCNC: 29 U/L
BASOPHILS # BLD AUTO: 0.02 K/UL
BASOPHILS NFR BLD: 0.2 %
BILIRUB SERPL-MCNC: 1.8 MG/DL
BILIRUB SERPL-MCNC: 2.2 MG/DL
BUN SERPL-MCNC: 14 MG/DL
BUN SERPL-MCNC: 15 MG/DL
CALCIUM SERPL-MCNC: 8.6 MG/DL
CALCIUM SERPL-MCNC: 8.8 MG/DL
CHLORIDE SERPL-SCNC: 109 MMOL/L
CHLORIDE SERPL-SCNC: 111 MMOL/L
CK SERPL-CCNC: 273 U/L
CO2 SERPL-SCNC: 24 MMOL/L
CO2 SERPL-SCNC: 26 MMOL/L
CREAT SERPL-MCNC: 0.7 MG/DL
CREAT SERPL-MCNC: 0.7 MG/DL
DIFFERENTIAL METHOD: ABNORMAL
EOSINOPHIL # BLD AUTO: 0.1 K/UL
EOSINOPHIL NFR BLD: 1.3 %
ERYTHROCYTE [DISTWIDTH] IN BLOOD BY AUTOMATED COUNT: 12.7 %
EST. GFR  (AFRICAN AMERICAN): >60 ML/MIN/1.73 M^2
EST. GFR  (AFRICAN AMERICAN): >60 ML/MIN/1.73 M^2
EST. GFR  (NON AFRICAN AMERICAN): >60 ML/MIN/1.73 M^2
EST. GFR  (NON AFRICAN AMERICAN): >60 ML/MIN/1.73 M^2
GLUCOSE SERPL-MCNC: 137 MG/DL
GLUCOSE SERPL-MCNC: 84 MG/DL
HCT VFR BLD AUTO: 39.7 %
HGB BLD-MCNC: 13.2 G/DL
HIV 1+2 AB+HIV1 P24 AG SERPL QL IA: NEGATIVE
LYMPHOCYTES # BLD AUTO: 2.1 K/UL
LYMPHOCYTES NFR BLD: 24.5 %
MAGNESIUM SERPL-MCNC: 1.8 MG/DL
MCH RBC QN AUTO: 29.5 PG
MCHC RBC AUTO-ENTMCNC: 33.2 G/DL
MCV RBC AUTO: 89 FL
MONOCYTES # BLD AUTO: 0.8 K/UL
MONOCYTES NFR BLD: 8.7 %
NEUTROPHILS # BLD AUTO: 5.6 K/UL
NEUTROPHILS NFR BLD: 64 %
PHOSPHATE SERPL-MCNC: 2.8 MG/DL
PLATELET # BLD AUTO: 127 K/UL
PMV BLD AUTO: 10.3 FL
POTASSIUM SERPL-SCNC: 3.1 MMOL/L
POTASSIUM SERPL-SCNC: 3.4 MMOL/L
PROT SERPL-MCNC: 5.8 G/DL
PROT SERPL-MCNC: 6 G/DL
RBC # BLD AUTO: 4.48 M/UL
RPR SER QL: NORMAL
SODIUM SERPL-SCNC: 141 MMOL/L
SODIUM SERPL-SCNC: 143 MMOL/L
WBC # BLD AUTO: 8.66 K/UL

## 2018-07-18 PROCEDURE — 25000003 PHARM REV CODE 250: Performed by: STUDENT IN AN ORGANIZED HEALTH CARE EDUCATION/TRAINING PROGRAM

## 2018-07-18 PROCEDURE — 36415 COLL VENOUS BLD VENIPUNCTURE: CPT

## 2018-07-18 PROCEDURE — 80053 COMPREHEN METABOLIC PANEL: CPT

## 2018-07-18 PROCEDURE — 25000003 PHARM REV CODE 250: Performed by: NURSE PRACTITIONER

## 2018-07-18 PROCEDURE — 84100 ASSAY OF PHOSPHORUS: CPT

## 2018-07-18 PROCEDURE — 99233 SBSQ HOSP IP/OBS HIGH 50: CPT | Mod: ,,, | Performed by: NURSE PRACTITIONER

## 2018-07-18 PROCEDURE — 97110 THERAPEUTIC EXERCISES: CPT

## 2018-07-18 PROCEDURE — 82550 ASSAY OF CK (CPK): CPT

## 2018-07-18 PROCEDURE — 85025 COMPLETE CBC W/AUTO DIFF WBC: CPT

## 2018-07-18 PROCEDURE — 80053 COMPREHEN METABOLIC PANEL: CPT | Mod: 91

## 2018-07-18 PROCEDURE — 97530 THERAPEUTIC ACTIVITIES: CPT

## 2018-07-18 PROCEDURE — 83735 ASSAY OF MAGNESIUM: CPT

## 2018-07-18 PROCEDURE — 94761 N-INVAS EAR/PLS OXIMETRY MLT: CPT

## 2018-07-18 RX ORDER — LANOLIN ALCOHOL/MO/W.PET/CERES
400 CREAM (GRAM) TOPICAL ONCE
Status: COMPLETED | OUTPATIENT
Start: 2018-07-18 | End: 2018-07-18

## 2018-07-18 RX ORDER — LISINOPRIL 2.5 MG/1
2.5 TABLET ORAL DAILY
Qty: 30 TABLET | Refills: 0 | Status: SHIPPED | OUTPATIENT
Start: 2018-07-19 | End: 2018-08-18

## 2018-07-18 RX ORDER — POTASSIUM CHLORIDE 20 MEQ/1
60 TABLET, EXTENDED RELEASE ORAL ONCE
Status: COMPLETED | OUTPATIENT
Start: 2018-07-18 | End: 2018-07-18

## 2018-07-18 RX ADMIN — LISINOPRIL 2.5 MG: 2.5 TABLET ORAL at 08:07

## 2018-07-18 RX ADMIN — POTASSIUM CHLORIDE 60 MEQ: 20 TABLET, EXTENDED RELEASE ORAL at 10:07

## 2018-07-18 RX ADMIN — Medication 400 MG: at 08:07

## 2018-07-18 NOTE — PLAN OF CARE
Problem: Patient Care Overview  Goal: Plan of Care Review  Outcome: Ongoing (interventions implemented as appropriate)  Plan of care reviewed with patient. Patient on continuous heart monitoring (NSR HR in 70s) . Fall precautions enforced, bed locked and low side rails x 3, call bell within reach will continue to monitor.

## 2018-07-18 NOTE — PLAN OF CARE
Future Appointments  Date Time Provider Department Center   7/25/2018 3:40 PM Daniel Haas MD [facility]        07/18/18 8856   Final Note   Assessment Type Final Discharge Note   Discharge Disposition Home   Hospital Follow Up  Appt(s) scheduled? Yes   Discharge plans and expectations educations in teach back method with documentation complete? Yes   Right Care Referral Info   Post Acute Recommendation No Care

## 2018-07-18 NOTE — ASSESSMENT & PLAN NOTE
LVEF 20-25% per echo   Euvolemic on exam   Intermittent junctional rhythm on tele review- resolved    ACEI initiated and tolerated, Holding off on BB given bradycardia in the low 40s. Will initiate as outpatient if HR improves   Do not anticipate he will need a diuretic upon DC  Follow up with cardiology as outpatient     NSTEMI - demand in setting of polysubstance abuse with respiratory failure    FLAKITO resolved and  shock liver improving     Repeat echo 90D post GDMT initiation to re-eval LVEF

## 2018-07-18 NOTE — DISCHARGE SUMMARY
[facility]  Discharge Summary      Admit Date: 7/14/2018    Discharge Date and Time: 7/18/18 at 1431    Attending Physician: Dr. Leos    Reason for Admission: Acute respiratory failure with hypoxia and hypercarbia, Cardiogenic Shock    Procedures Performed: * No surgery found *    Hospital Course   Mr. Eliu Herrera is a 23 y.o. male who presented to the ED by EMS after a toxic ingestion. The patient was intubated upon exam and was unable to provide any information. Per review of medical record and EMS, the patient had no significant pmhx, medications, or allergies. It was documented that ecstasy was possibly ingested, and his urine tox screen was positive for opioids and THC. Last seen unaltered at 12 am of day he presented to ED on 7/14/18.     In the ED, the patient was treated with Narcan with little improvement. Furthermore, upon presentation in the ED, the patient was unresponsive and unable to protect his airway; therefore, the patient was intubated.     Over his course in the ED, Mr. Herrera had a CMP that was notable for a increased anion gap of 17, a serum Cr 2.2, BUN; Lactic acid was elevated at 8.0; Urinalysis showed 3+ occult blood and microscopy showing RBCs (10) and Hyaline Casts (20); Patient had an elevated Troponin of 0.1 and a Procalcitonin elevated at 12.87 in the setting of renal dysfunction. ABG taken in ED was noted to have pH of 7.2, pCO2 of 50.1, pO2 of 57, HCO3 of 20.9, Saturated O2 83, and TCO2 22. Pt also had sinus tach on EKG, CT of head was without acute processes, CXR showed pulmonary edema vs aspiration vs ARDS. Bedside ultrasound was also concerning for low ejection fraction.     Mr. Herrera was covered for sepsis with IV fluids and broad-spectrum antibiotics, and CVL placed and Levophed started for profound hypotension in the setting of cardiogenic shock, and was subsequently admitted to [facility] on the ICU floor. Cardiology was  consulted and saw the patient and recommended continuing the levophed and starting a beta blocker and ACE inhibitor when the patient was more stable. Pulmonology consulted for ventilation management and respiratory failure; while the patient was intubated he was sedated with fentanyl and propofol and paralyzed with Nimbex; respiratory cultures taken.     On 7/15/18, paralytic turned off, weaning of sedation started in preparation for SBT. On 7/16/18, Mr. Herrera was extubated to 3L NC. Sats >92%. SB with occasional junctional bradycardia on tele. No further junctional bradycardia noted. Troponin trending down. Therefore, due to the improvement of the patient clinical condition, we decided to transfer Mr. Herrera to the floor.     On 7/17/18, CPK continued trending down and his blood pressure was stable. Speech therapy (ST) saw the patient in order to conduct a swallow study, and they recommended no further ST needs indicated. PT/OT saw him, and  PT recommended cardiac rehab as per cardiology deems necessary, OT did not recommend further acute OT services. On 7/18/18, heart rate was decreased overnight, so beta blocker continued to be held. Psychiatry was consulted for the patient with respect for possible polysubstance abuse and recommended that the patient could be discharged when medically stable. Therefore, the patient was discharged on 7/18/18.The patient's pain was well controlled prior to discharge. All questions and concerns were addressed. The patient was encouraged to call the clinic for any additional questions/concerns. The patient was discharged in stable condition.       Consults: cardiology, pulmonary/intensive care, psychiatry, PT, OT and ST    Significant Diagnostic Studies: Labs:   BMP:     Recent Labs  Lab 07/17/18  0505 07/18/18  0433 07/18/18  1412    84 137*   * 143 141   K 3.9 3.1* 3.4*   * 109 111*   CO2 26 26 24   BUN 16 15 14   CREATININE 0.7 0.7 0.7   CALCIUM 8.7 8.6* 8.8    MG 1.9 1.8  --    , CMP     Recent Labs  Lab 07/17/18  0505 07/18/18  0433 07/18/18  1412   * 143 141   K 3.9 3.1* 3.4*   * 109 111*   CO2 26 26 24    84 137*   BUN 16 15 14   CREATININE 0.7 0.7 0.7   CALCIUM 8.7 8.6* 8.8   PROT 5.7* 5.8* 6.0   ALBUMIN 2.8* 2.9* 3.0*   BILITOT 2.1* 2.2* 1.8*   ALKPHOS 67 61 61   AST 41* 27 29   ALT 39 36 38   ANIONGAP 8 8 6*   ESTGFRAFRICA >60 >60 >60   EGFRNONAA >60 >60 >60   , CBC     Recent Labs  Lab 07/17/18  1651 07/18/18  0433   WBC 11.85 8.66   HGB 13.5* 13.2*   HCT 40.3 39.7*   * 127*   , INR   Lab Results   Component Value Date    INR 1.3 (H) 07/14/2018   , Lipid Panel No results found for: CHOL, HDL, LDLCALC, TRIG, CHOLHDL, Troponin     Recent Labs  Lab 07/15/18  0013   TROPONINI 1.244*    and A1C:     Recent Labs  Lab 07/14/18  1154   HGBA1C 5.1     Microbiology:   Blood Culture   Lab Results   Component Value Date    LABBLOO No Growth to date 07/14/2018    LABBLOO No Growth to date 07/14/2018    LABBLOO No Growth to date 07/14/2018    LABBLOO No Growth to date 07/14/2018    LABBLOO No Growth to date 07/14/2018   , Sputum Culture   Lab Results   Component Value Date    GSRESP <10 epithelial cells per low power field. 07/14/2018    GSRESP Rare WBC's 07/14/2018    GSRESP Few Gram positive cocci 07/14/2018    RESPIRATORYC  07/14/2018     KD ALBICANS  Moderate  Normal respiratory karolina also present      and Urine Culture    Lab Results   Component Value Date    LABURIN No growth 07/14/2018     Radiology: X-Ray: CXR: X-Ray Chest 1 View (CXR):   Results for orders placed or performed during the hospital encounter of 07/14/18   X-Ray Chest 1 View    Narrative    EXAMINATION:  XR CHEST 1 VIEW    CLINICAL HISTORY:  pneumomediastinum;    TECHNIQUE:  Single frontal view of the chest was performed.    COMPARISON:  07/17/2018    FINDINGS:  Monitoring leads overlie the chest.  There is redemonstration of a large volume of subcutaneous emphysema within  the lower neck and left chest wall, similar to prior examination.  There is possible minimal pneumomediastinum.  The lungs are symmetrically expanded without evidence of pneumothorax or significant interval detrimental change compared to prior chest radiograph.      Impression    Persistent significant subcutaneous emphysema throughout the lower neck and left chest wall with probable small component of pneumomediastinum, unchanged from prior exam      Electronically signed by: Dana Chavez MD  Date:    07/18/2018  Time:    07:15    and X-Ray Chest PA and Lateral (CXR): No results found for this visit on 07/14/18.  CT scan: CT ABDOMEN PELVIS WITH CONTRAST: No results found for this visit on 07/14/18. and CT ABDOMEN PELVIS WITHOUT CONTRAST: No results found for this visit on 07/14/18.  Cardiac Graphics: Echocardiogram:   2D echo with color flow doppler:   Results for orders placed or performed during the hospital encounter of 07/14/18   2D echo with color flow doppler   Result Value Ref Range    EF 25 (A) 55 - 65    Diastolic Dysfunction Yes (A)     Est. PA Systolic Pressure 33.15     Mitral Valve Mobility NORMAL     Tricuspid Valve Regurgitation MILD          Final Diagnoses:    Principal Problem: Acute respiratory failure with hypoxia and hypercarbia   Secondary Diagnoses:   Active Hospital Problems    Diagnosis  POA    *Acute respiratory failure with hypoxia and hypercarbia [J96.01, J96.02]  Unknown    Ventricular escape rhythm [I49.3]  Unknown    Altered mental status [R41.82]  Yes    Acute systolic heart failure [I50.21]  Unknown    Respiratory failure [J96.90]  Yes    Polysubstance abuse [F19.10]  Unknown    Shock [R57.9]  Unknown      Resolved Hospital Problems    Diagnosis Date Resolved POA   No resolved problems to display.       Discharged Condition: stable    Disposition: Home or Self Care    Follow Up/Patient Instructions:     Medications:  Reconciled Home Medications:      Medication List       START taking these medications    lisinopril 2.5 MG tablet  Commonly known as:  PRINIVIL,ZESTRIL  Take 1 tablet (2.5 mg total) by mouth once daily. for 30 doses  Start taking on:  7/19/2018            Discharge Procedure Orders  Ambulatory Referral to Cardiology   Referral Priority: Routine Referral Type: Consultation   Referral Reason: Specialty Services Required    Requested Specialty: Cardiology    Number of Visits Requested: 1      Ambulatory Referral to [facility]   Referral Priority: Routine Referral Type: Consultation   Referral Reason: Specialty Services Required    Requested Specialty: Family Medicine    Number of Visits Requested: 1        Follow-up Information     Tk Goode PA-C On 7/25/2018.    Specialty:  Family Medicine  Why:  3:40  Contact information:  [address]  [address]  [facility] [address] [phone]             Dang Farley MD In 2 weeks.    Specialty:  Cardiology  Contact information:  [address] [facility] [address] [phone]                 Greater than > 30 minutes spend with the patient on discharging day.

## 2018-07-18 NOTE — PROGRESS NOTES
.Pharmacy New Medication Education    Patient accepted medication education.    Pharmacy educated patient on name and purpose of medications and possible side effects, using the teach-back method.     Current Inpatient Medication Orders   dextrose 50% injection 12.5 g   dextrose 50% injection 25 g   dextrose 50% injection 25 g   glucagon (human recombinant) injection 1 mg   glucose chewable tablet 16 g   glucose chewable tablet 24 g   lisinopril tablet 2.5 mg   potassium chloride SA CR tablet 60 mEq   sodium chloride 0.9% flush 5 mL       Learners of pharmacy medication education included:  Patient    Patient +/- learner response:  Verbalized Understanding, Teachback

## 2018-07-18 NOTE — PROGRESS NOTES
[facility]  Progress Note    Subjective:       Chief Complaint/Reason for Admission: Drug Overdose    Interval History: Pt had a few instances of bradycardia overnight with HR of 56-52. Beta blocker held. He is now breathing on room air. Pt reports that he continues to feel better. He is tolerating a cardiac diet. Denies SOB, CP, nausea, vomiting, fevers, or chills.     Review of Systems:   All systems other than above negative.      Patient Active Problem List    Diagnosis Date Noted    Ventricular escape rhythm 07/17/2018    Altered mental status 07/17/2018    Acute systolic heart failure 07/16/2018    Respiratory failure 07/14/2018    Polysubstance abuse     Acute respiratory failure with hypoxia and hypercarbia     Shock      History reviewed. No pertinent past medical history.   History reviewed. No pertinent surgical history.   No prescriptions prior to admission.     Review of patient's allergies indicates:   Allergen Reactions    Penicillins Rash      Social History   Substance Use Topics    Smoking status: Current Every Day Smoker    Smokeless tobacco: Never Used    Alcohol use Not on file      History reviewed. No pertinent family history.       OBJECTIVE:     Vital signs in last 24 hours:  Temp:  [96.9 °F (36.1 °C)-99 °F (37.2 °C)] 96.9 °F (36.1 °C)  Pulse:  [52-92] 65  Resp:  [16-20] 20  SpO2:  [90 %-94 %] 94 %  BP: (110-128)/(56-72) 111/64    Constitutional: He appears well-developed.   General: Pt resting in bed off nasal canula , in no acute distress  HENT:   Neck: supple w/o JVD  Head: Normocephalic and atraumatic.   Eyes: Pupils were symmetric and approximately 6 mm in size.   Neck: No traumatic lesions noted.   Cardiovascular: Sinus rhythm.  Exam reveals no gallop, murmurs, and no friction rub.  Pulmonary/Chest: No increased effort of breathing, Lungs clear to auscultation bilaterally, chest non tender to palpation  Abdominal: Soft. He exhibits no distension. Normoactive bowel  sounds  Musculoskeletal: No clubbing, cyanosis, or edema appreciated; distal pulses intact.    Neurological: Alert & oriented X 3   Skin: Skin is warm and dry. No rashes noted.     Data Review:   CBC:   Lab Results   Component Value Date    WBC 8.66 07/18/2018    RBC 4.48 (L) 07/18/2018    HGB 13.2 (L) 07/18/2018    HCT 39.7 (L) 07/18/2018     (L) 07/18/2018     BMP:   Lab Results   Component Value Date    GLU 84 07/18/2018     07/18/2018    K 3.1 (L) 07/18/2018     07/18/2018    CO2 26 07/18/2018    BUN 15 07/18/2018    CREATININE 0.7 07/18/2018    CALCIUM 8.6 (L) 07/18/2018     Coagulation:   Lab Results   Component Value Date    INR 1.3 (H) 07/14/2018    APTT 27.4 07/14/2018     Cardiac markers: No results found for: CKMB, TROPONINT, MYOGLOBIN  ABGs:   Lab Results   Component Value Date    PH 7.482 (H) 07/16/2018    PO2 145 (H) 07/16/2018    PCO2 35.4 07/16/2018     Micro:   07/17/18 RPR - Non-reactive     07/14/18 Aerobic & Anaerobic Blood Cultures x 4: NGTD x 4 days    07/14/18 Urine Culture: No growth - FINAL    07/14/18 Aerobic & Anaerobic Blood Cultures x 2: Aerobic bottle grew Gram(+) cocci in clusters resembling staph, coagulase-negative staph species, probable contaminant.     Radiology review:   CXR 7/18/2018 showed persistent significant subcutaneous emphysema in lower neck and chest wall with probable small component of pneumomediastinum, unchanged from prior CXR.     ASSESSMENT/PLAN:     Active Hospital Problems    Diagnosis  POA    *Acute respiratory failure with hypoxia and hypercarbia [J96.01, J96.02]  Unknown    Ventricular escape rhythm [I49.3]  Unknown    Altered mental status [R41.82]  Yes    Acute systolic heart failure [I50.21]  Unknown    Respiratory failure [J96.90]  Yes    Polysubstance abuse [F19.10]  Unknown    Shock [R57.9]  Unknown      Resolved Hospital Problems    Diagnosis Date Resolved POA   No resolved problems to display.     Mr. Eliu Herrera w/ no  significant PMHx presented to the ED in respiratory failure secondary to possible drug overdose.     1. Cardiogenic Shock  - Unknown cause of cardiogenic shock.    -  Norepi (Pressor) discontinued on 7/16 after extubation  - Continue telemetry  - Keep MAP >65 mm Hg  - Keep Mg 2, K 4   - Lisinopril 2.5mg QD started per Cards rec  - NSTEMI 2/2 demand ischemia mismatch  - EF of 25-30%  -Will repeat Echo in a few months. Encouraged to maintain his outpatient [facility] cardiology appt      2. Respiratory failure  - Has improved  - Blood cxs positive but states it was likely contaminate; decided to re culture, and restarted Vanco and Zosyn  - Neuro checks q 4 hours  - Continue Pulse Ox  - Resp Cx show C. Albicans, unlikely pathogenic   -CXR shows pneumomediastinum, will continue to monitor, but no acute intervention needed      3. Rhabdomyolysis secondary to drug overdose  -CPK trending down, currently 273  -UOP adequate  -Continue to hydrate and monitor  -Improving     4. Polysubstance abuse  -Possible Ecstasy ingestion  -Utox in ED positive for opioids & THC  -Psych recommends that he is clinically stable to be discharged   -HIV is negative  -RPR negative     PPX:  -Lovenox    Diet:  -Cardiac diet    Dispo:  - Lytes repeated  - Placed inpatient psych consult due to drug overdose; per [facility] patient is stable for discharge  - Urine culture and UA neg to date  - Resp culture grew Candida albicans, unlikely pathogenic  - Replaced his K this am, reordered CMP; once CMP results are back and stable, then the patient will be discharged if he continues to remain clinically stable

## 2018-07-18 NOTE — PLAN OF CARE
Problem: Patient Care Overview  Goal: Plan of Care Review  Outcome: Ongoing (interventions implemented as appropriate)  Plan of care reviewed with patient. Patient voiced understanding. NSR on monitor. NC on satting at 92%  No acute distress noted. Side rails x 2, bed in lowest position, call bell within reach, pt advised to call for assistance. Maintain bed alarm for patient safety.

## 2018-07-18 NOTE — SUBJECTIVE & OBJECTIVE
Review of Systems   Constitution: Negative for diaphoresis, weakness and malaise/fatigue.   HENT: Negative.    Eyes: Negative.    Cardiovascular: Negative for chest pain, claudication, dyspnea on exertion, irregular heartbeat, leg swelling, near-syncope, orthopnea, palpitations, paroxysmal nocturnal dyspnea and syncope.   Respiratory: Negative.  Negative for cough, shortness of breath, sputum production and wheezing.    Endocrine: Negative.    Hematologic/Lymphatic: Negative.    Skin: Negative.    Musculoskeletal: Negative.    Gastrointestinal: Negative.    Genitourinary: Negative.    Neurological: Negative.    Psychiatric/Behavioral: Negative.    Allergic/Immunologic: Negative.      Objective:     Vital Signs (Most Recent):  Temp: 99.4 °F (37.4 °C) (07/18/18 1116)  Pulse: (!) 52 (07/18/18 1116)  Resp: 18 (07/18/18 1116)  BP: 121/67 (07/18/18 1116)  SpO2: 100 % (07/18/18 1133) Vital Signs (24h Range):  Temp:  [96.9 °F (36.1 °C)-99.4 °F (37.4 °C)] 99.4 °F (37.4 °C)  Pulse:  [52-92] 52  Resp:  [16-20] 18  SpO2:  [93 %-100 %] 100 %  BP: (110-123)/(56-67) 121/67     Weight: 69.4 kg (153 lb)  Body mass index is 23.26 kg/m².     SpO2: 100 %  O2 Device (Oxygen Therapy): room air      Intake/Output Summary (Last 24 hours) at 07/18/18 1309  Last data filed at 07/18/18 1200   Gross per 24 hour   Intake              455 ml   Output             1275 ml   Net             -820 ml       Lines/Drains/Airways     Peripheral Intravenous Line                 Peripheral IV - Single Lumen 07/14/18 0720 Left Wrist 4 days         Peripheral IV - Single Lumen 07/14/18 0737 Right Forearm 4 days         Peripheral IV - Single Lumen 07/14/18 0846 Left Forearm 4 days                Physical Exam   Constitutional: He is oriented to person, place, and time. He appears well-developed and well-nourished. No distress.   HENT:   Head: Normocephalic.   Eyes: Right eye exhibits no discharge. Left eye exhibits no discharge.   Neck: No JVD present.    Cardiovascular: Normal rate, regular rhythm and normal heart sounds.  Exam reveals no gallop and no friction rub.    No murmur heard.  Pulmonary/Chest: Effort normal and breath sounds normal.   Abdominal: Soft. Bowel sounds are normal.   Musculoskeletal: He exhibits no edema.   Neurological: He is alert and oriented to person, place, and time.   Skin: Skin is warm and dry. He is not diaphoretic.   Psychiatric: He has a normal mood and affect. His behavior is normal. Judgment and thought content normal.       Significant Labs:   Blood Culture: No results for input(s): LABBLOO in the last 48 hours., BMP:     Recent Labs  Lab 07/17/18  0505 07/18/18  0433    84   * 143   K 3.9 3.1*   * 109   CO2 26 26   BUN 16 15   CREATININE 0.7 0.7   CALCIUM 8.7 8.6*   MG 1.9 1.8   , CMP     Recent Labs  Lab 07/17/18  0505 07/18/18  0433   * 143   K 3.9 3.1*   * 109   CO2 26 26    84   BUN 16 15   CREATININE 0.7 0.7   CALCIUM 8.7 8.6*   PROT 5.7* 5.8*   ALBUMIN 2.8* 2.9*   BILITOT 2.1* 2.2*   ALKPHOS 67 61   AST 41* 27   ALT 39 36   ANIONGAP 8 8   ESTGFRAFRICA >60 >60   EGFRNONAA >60 >60   , CBC     Recent Labs  Lab 07/17/18  1651 07/18/18  0433   WBC 11.85 8.66   HGB 13.5* 13.2*   HCT 40.3 39.7*   * 127*   , INR No results for input(s): INR, PROTIME in the last 48 hours., Lipid Panel No results for input(s): CHOL, HDL, LDLCALC, TRIG, CHOLHDL in the last 48 hours., Troponin No results for input(s): TROPONINI in the last 48 hours. and All pertinent lab results from the last 24 hours have been reviewed.    Significant Imaging: Echocardiogram:   2D echo with color flow doppler:   Results for orders placed or performed during the hospital encounter of 07/14/18   2D echo with color flow doppler   Result Value Ref Range    EF 25 (A) 55 - 65    Diastolic Dysfunction Yes (A)     Est. PA Systolic Pressure 33.15     Mitral Valve Mobility NORMAL     Tricuspid Valve Regurgitation MILD

## 2018-07-18 NOTE — PLAN OF CARE
Problem: Physical Therapy Goal  Goal: Physical Therapy Goal  Outcome: Ongoing (interventions implemented as appropriate)  Patient performed cardiac rehab session this AM with education for home walking program; initial /61 HR 63 O2 sats 97% on RA; during amb at leisurely speed for 3.5 minutes, HR up to 90 and O2 sats primarily 95% with brief decrease to 93%; post amb/rest x 2-3 min /73 HR 63 O2 sats 97% on RA; will monitor.

## 2018-07-18 NOTE — PT/OT/SLP PROGRESS
Physical Therapy Treatment    Patient Name:  Eliu Herrera   MRN:  [id number]    Recommendations:     Discharge Recommendations:  home (OP cardiac rehab as cardiologist deems necessary)   Discharge Equipment Recommendations: none   Barriers to discharge: None    Assessment:     Eliu Herrera is a 23 y.o. male admitted with a medical diagnosis of Acute respiratory failure with hypoxia and hypercarbia.  He presents with the following impairments/functional limitations:   (continue to monitor educational needs) Patient performed cardiac rehab session this AM with education for home walking program;  will monitor..    Rehab Prognosis:  good; patient would benefit from acute skilled PT services to address these deficits and reach maximum level of function.      Recent Surgery: * No surgery found *      Plan:     During this hospitalization, patient to be seen 3 x/week (monitor) to address the above listed problems via  (education)  · Plan of Care Expires:  08/17/18   Plan of Care Reviewed with: patient    Subjective     Communicated with nurse prior to session.  Patient found supine in bed upon PT entry to room, agreeable to treatment.      Chief Complaint: no complaints  Patient comments/goals: pt tearful after session  Pain/Comfort:  · Pain Rating 1: 0/10  · Pain Rating Post-Intervention 1: 0/10    Patients cultural, spiritual, Restorationism conflicts given the current situation: none    Objective:     Patient found with: telemetry, oxygen     General Precautions: Standard, fall   Orthopedic Precautions:N/A   Braces: N/A     Functional Mobility:  · Alana with all bed mobility, transfers and ambulation      AM-PAC 6 CLICK MOBILITY  Turning over in bed (including adjusting bedclothes, sheets and blankets)?: 4  Sitting down on and standing up from a chair with arms (e.g., wheelchair, bedside commode, etc.): 4  Moving from lying on back to sitting on the side of the bed?: 4  Moving to and from a bed to a chair (including a  wheelchair)?: 4  Need to walk in hospital room?: 4  Climbing 3-5 steps with a railing?: 4  Basic Mobility Total Score: 24       Therapeutic Activities and Exercises:  Initial sitting /61 HR 63 O2 sats 97% on RA; during amb at leisurely speed for 3.5 minutes, HR up to 90 and O2 sats primarily 95% with brief decrease to 93%; post amb/rest x 2-3 min /73 HR 63 O2 sats 97% on RA; pt educated and performed warm up ex, cool down ex and LE stretches for major ms groups; also educated in home walking program, sx of overexertion, diaphragmatic/pursed lip breathing, pulse taking, rate of perceived exertion scale, ejection fraction; activities and METs; handout given    Patient left sitting EOB  with all lines intact, call button in reach, nurse notified and family present..    GOALS:    Physical Therapy Goals        Problem: Physical Therapy Goal    Goal Priority Disciplines Outcome Goal Variances Interventions   Physical Therapy Goal     PT/OT, PT Ongoing (interventions implemented as appropriate)     Description:  Goals to be met by: 2018     Patient will increase functional independence with mobility by performin. Patient will demonstrate knowledge of symptoms of overexertion/rate of perceived exertion.  2. Patient will demonstrate knowledge of pursed lip breathing.  3. Patient will demonstrate knowledge of home walking exercise program  4. Patient will demonstrate Laquita with all activities/mobility              Problem: Physical Therapy Goal    Goal Priority Disciplines Outcome Goal Variances Interventions   Physical Therapy Goal     PT/OT, PT Ongoing (interventions implemented as appropriate)                     Time Tracking:     PT Received On: 18  PT Start Time: 915     PT Stop Time: 958  PT Total Time (min): 43 min     Billable Minutes:  Therapeutic Exercises 15 minutes Therapeutic Activities 28 minutes    Treatment Type: Treatment  PT/PTA: PT     PTA Visit Number: 0     Dacia Funk  PT  07/18/2018

## 2018-07-18 NOTE — PROGRESS NOTES
Pulmonary & Critical Care Medicine Progress Note    Subjective:   No issues since being transferred to the floor. OOB and walking. Off O2. No complaints this AM    Vital Signs:   Vitals:    07/18/18 0729   BP: 111/64   Pulse: 65   Resp: 20   Temp: 96.9 °F (36.1 °C)     Fluid Balance:     Intake/Output Summary (Last 24 hours) at 07/18/18 0839  Last data filed at 07/18/18 0701   Gross per 24 hour   Intake              490 ml   Output             1575 ml   Net            -1085 ml     Physical Exam:   General: NAD, awake, able to follow commands  HEENT: AT/NC, PERRL, oral mucosa moist.   Neck: Supple without JVD or palpable LAD.    Cardiac: S1S2 with brisk cap refill in distal extremities.  Respiratory: Lungs clear bilaterally with no increased work of breathing; crackles when pressing on the SCM area of the neck and anterior chest  Abdomen: Soft, NT/ND. +BS.   Extremities: No edema.   Neuro: Grossly intact to brief exam.    Laboratory Studies:   No results for input(s): PH, PCO2, PO2, HCO3, POCSATURATED, BE in the last 24 hours.    Recent Labs  Lab 07/18/18  0433   WBC 8.66   RBC 4.48*   HGB 13.2*   HCT 39.7*   *   MCV 89   MCH 29.5   MCHC 33.2       Recent Labs  Lab 07/18/18  0433      K 3.1*      CO2 26   BUN 15   CREATININE 0.7   MG 1.8     Microbiology Data:   Microbiology Results (last 7 days)     Procedure Component Value Units Date/Time    Blood culture x two cultures. Draw prior to antibiotics. [[id number]] Collected:  07/14/18 0847    Order Status:  Completed Specimen:  Blood from Peripheral, Forearm, Left Updated:  07/17/18 1422     Blood Culture, Routine No Growth to date     Blood Culture, Routine No Growth to date     Blood Culture, Routine No Growth to date     Blood Culture, Routine No Growth to date    Narrative:       Aerobic and anaerobic    Blood culture x two cultures. Draw prior to antibiotics. [[id number]] Collected:  07/14/18 0738    Order Status:  Completed Specimen:  Blood from  Peripheral, Forearm, Right Updated:  07/17/18 0954     Blood Culture, Routine Gram stain aer bottle: Gram positive cocci in clusters resembling Staph     Blood Culture, Routine Results called to and read back by: Martin Garcia RN 07/15/2018  10:45     Blood Culture, Routine --     COAGULASE-NEGATIVE STAPHYLOCOCCUS SPECIES  Organism is a probable contaminant      Narrative:       Aerobic and anaerobic    Culture, Respiratory with Gram Stain [[id number]] Collected:  07/14/18 1700    Order Status:  Completed Specimen:  Respiratory from Endotracheal Aspirate Updated:  07/16/18 1503     Respiratory Culture --     CANDIDA ALBICANS  Moderate  Normal respiratory karolina also present       Gram Stain (Respiratory) <10 epithelial cells per low power field.     Gram Stain (Respiratory) Rare WBC's     Gram Stain (Respiratory) Few Gram positive cocci    Urine culture - High Risk [[id number]] Collected:  07/14/18 0757    Order Status:  Completed Specimen:  Urine from Urine, Catheterized Updated:  07/15/18 1105     Urine Culture, Routine No growth         Chest Imaging:   CXR this AM w/ air in the subQ neck area    Infusions:      Scheduled Medications:    lisinopril  2.5 mg Oral Daily    magnesium oxide  400 mg Oral Once    potassium chloride  60 mEq Oral Once     PRN Medications:   dextrose 50%, dextrose 50%, dextrose 50%, glucagon (human recombinant), glucose, glucose, sodium chloride 0.9%    Assessment & Plan:   Patient Active Problem List   Diagnosis    Respiratory failure    Polysubstance abuse    Acute respiratory failure with hypoxia and hypercarbia    Shock    Acute systolic heart failure    Ventricular escape rhythm    Altered mental status     This is a case of cardiogenic shock in the setting of ecstasy and drug abuse.    Neuro:  -CAM negative     Cardio:  -Shock likely 2/2 new cardiomyopathy; ?underlying cause  -off pressors  -ACEI started by cards for CMPY; will need f/u echo      Pulm:  -Cardiogenic vs  noncardiogenic pulmonary edema  -Likely cardiogenic given patient's new LVEF of 20%  -off O2 now  Pneumomediastinum  -continue to monitor; no acute intervention needed       Renal:  -UOP adequate  -Bun/Cr improving  Hypokalemia  -replace per primary    ID:  -Afebrile  -Lactate 8.0 --> 2.8  -Procal 12.87 (in the setting of renal dysfunction)  -UA clean  -BCx w/ CoNS; likely contaminant  -Respiratory culture w/ C. Albicans; very unlikely pathogenic    Thank you for involving us in the care of this patient. We will continue to follow along. Please call with any questions.    Ramírez Sweet  [employer] PCCM Fellow, PGY 4  [facility]  My cell: [phone]

## 2018-07-18 NOTE — PROGRESS NOTES
[facility]  Cardiology  Progress Note    Patient Name: Eliu Herrera  MRN: [id number]  Admission Date: 7/14/2018  Hospital Length of Stay: 4 days  Code Status: Full Code   Attending Physician: Severyn Yaroshevsky, MD   Primary Care Physician: Primary Doctor No  Expected Discharge Date:   Principal Problem:Acute respiratory failure with hypoxia and hypercarbia    Subjective:     Hospital Course:   07/16/2018 SBT in process. Patient awake and alert, responding appropriately to simple commands. SB with occasional junctional bradycardia on tele. Continues to require levo for pressure support. Troponin trending down at this time.   07/17/2018 Extubated yesterday. Hemodynamically stable. SB as low as 42 on tele review to ST 110s. BP improved 100s-120s. ACEI initiated. No further junctional bradycardia noted. LFTs continue to improve.   07/18/2018 Patient hemodynamically stable. HR 40s-60s. SBP 110s. Appropriate for discharge to follow up with cardiology.         Review of Systems   Constitution: Negative for diaphoresis, weakness and malaise/fatigue.   HENT: Negative.    Eyes: Negative.    Cardiovascular: Negative for chest pain, claudication, dyspnea on exertion, irregular heartbeat, leg swelling, near-syncope, orthopnea, palpitations, paroxysmal nocturnal dyspnea and syncope.   Respiratory: Negative.  Negative for cough, shortness of breath, sputum production and wheezing.    Endocrine: Negative.    Hematologic/Lymphatic: Negative.    Skin: Negative.    Musculoskeletal: Negative.    Gastrointestinal: Negative.    Genitourinary: Negative.    Neurological: Negative.    Psychiatric/Behavioral: Negative.    Allergic/Immunologic: Negative.      Objective:     Vital Signs (Most Recent):  Temp: 99.4 °F (37.4 °C) (07/18/18 1116)  Pulse: (!) 52 (07/18/18 1116)  Resp: 18 (07/18/18 1116)  BP: 121/67 (07/18/18 1116)  SpO2: 100 % (07/18/18 1133) Vital Signs (24h Range):  Temp:  [96.9 °F (36.1 °C)-99.4 °F (37.4 °C)]  99.4 °F (37.4 °C)  Pulse:  [52-92] 52  Resp:  [16-20] 18  SpO2:  [93 %-100 %] 100 %  BP: (110-123)/(56-67) 121/67     Weight: 69.4 kg (153 lb)  Body mass index is 23.26 kg/m².     SpO2: 100 %  O2 Device (Oxygen Therapy): room air      Intake/Output Summary (Last 24 hours) at 07/18/18 1309  Last data filed at 07/18/18 1200   Gross per 24 hour   Intake              455 ml   Output             1275 ml   Net             -820 ml       Lines/Drains/Airways     Peripheral Intravenous Line                 Peripheral IV - Single Lumen 07/14/18 0720 Left Wrist 4 days         Peripheral IV - Single Lumen 07/14/18 0737 Right Forearm 4 days         Peripheral IV - Single Lumen 07/14/18 0846 Left Forearm 4 days                Physical Exam   Constitutional: He is oriented to person, place, and time. He appears well-developed and well-nourished. No distress.   HENT:   Head: Normocephalic.   Eyes: Right eye exhibits no discharge. Left eye exhibits no discharge.   Neck: No JVD present.   Cardiovascular: Normal rate, regular rhythm and normal heart sounds.  Exam reveals no gallop and no friction rub.    No murmur heard.  Pulmonary/Chest: Effort normal and breath sounds normal.   Abdominal: Soft. Bowel sounds are normal.   Musculoskeletal: He exhibits no edema.   Neurological: He is alert and oriented to person, place, and time.   Skin: Skin is warm and dry. He is not diaphoretic.   Psychiatric: He has a normal mood and affect. His behavior is normal. Judgment and thought content normal.       Significant Labs:   Blood Culture: No results for input(s): LABBLOO in the last 48 hours., BMP:     Recent Labs  Lab 07/17/18  0505 07/18/18  0433    84   * 143   K 3.9 3.1*   * 109   CO2 26 26   BUN 16 15   CREATININE 0.7 0.7   CALCIUM 8.7 8.6*   MG 1.9 1.8   , CMP     Recent Labs  Lab 07/17/18  0505 07/18/18  0433   * 143   K 3.9 3.1*   * 109   CO2 26 26    84   BUN 16 15   CREATININE 0.7 0.7   CALCIUM 8.7  8.6*   PROT 5.7* 5.8*   ALBUMIN 2.8* 2.9*   BILITOT 2.1* 2.2*   ALKPHOS 67 61   AST 41* 27   ALT 39 36   ANIONGAP 8 8   ESTGFRAFRICA >60 >60   EGFRNONAA >60 >60   , CBC     Recent Labs  Lab 07/17/18  1651 07/18/18  0433   WBC 11.85 8.66   HGB 13.5* 13.2*   HCT 40.3 39.7*   * 127*   , INR No results for input(s): INR, PROTIME in the last 48 hours., Lipid Panel No results for input(s): CHOL, HDL, LDLCALC, TRIG, CHOLHDL in the last 48 hours., Troponin No results for input(s): TROPONINI in the last 48 hours. and All pertinent lab results from the last 24 hours have been reviewed.    Significant Imaging: Echocardiogram:   2D echo with color flow doppler:   Results for orders placed or performed during the hospital encounter of 07/14/18   2D echo with color flow doppler   Result Value Ref Range    EF 25 (A) 55 - 65    Diastolic Dysfunction Yes (A)     Est. PA Systolic Pressure 33.15     Mitral Valve Mobility NORMAL     Tricuspid Valve Regurgitation MILD      Assessment and Plan:     Brief HPI: Patient seen this morning on rounds without cardiac complaint. Appropriate for discharge from cardiology standpoint.     Ventricular escape rhythm    Intermittent on 07/16/2018; likely in setting of polysubstance abuse and resolved with washout          Acute systolic heart failure    LVEF 20-25% per echo   Euvolemic on exam   Intermittent junctional rhythm on tele review- resolved    ACEI initiated and tolerated, Holding off on BB given bradycardia in the low 40s. Will initiate as outpatient if HR improves   Do not anticipate he will need a diuretic upon DC  Follow up with cardiology as outpatient     NSTEMI - demand in setting of polysubstance abuse with respiratory failure    FLAKITO resolved and  shock liver improving     Repeat echo 90D post GDMT initiation to re-eval LVEF           Shock    2/2 polysubstance abuse with out of hospital arrest, resolved            VTE Risk Mitigation         Ordered     IP VTE LOW RISK  PATIENT  Once      07/14/18 1152     Place sequential compression device  Until discontinued      07/14/18 1152          Que Moise NP  Cardiology  [facility]

## 2018-07-18 NOTE — CONSULTS
Discharge pt to home when medically clear, pt declines option of further treatment.  Mother is comfortable with taking patient home.

## 2018-07-19 ENCOUNTER — PATIENT OUTREACH (OUTPATIENT)
Dept: ADMINISTRATIVE | Facility: CLINIC | Age: 23
End: 2018-07-19

## 2018-07-19 LAB — BACTERIA BLD CULT: NORMAL

## 2018-07-19 NOTE — PATIENT INSTRUCTIONS
Altered Level of Consciousness  Level of consciousness (LOC) is a measure of a persons ability to interact with other people and to react to the surroundings. A person with an altered level of consciousness may not respond to touch or voices. He or she may look vacant or blank and may not make eye contact with others. He or she may be limp and may not move for a long time or show little interest in moving. He or she may also be confused.  There are many causes of altered LOC. They include low blood sugar, infection, medicines, injuries, or other medical problems.  Altered LOC is a medical emergency. The healthcare provider will do tests to help find the cause. These may include blood tests and imaging tests. The person is treated so breathing and heart rate are stable. An intravenous (IV) line may be put into a vein in the arm or hand to give medicines. Once the cause of altered LOC is found, the goal is to treat the cause.  In almost all cases, the person will be admitted to the hospital for observation.  Home care  When your loved one is released from the hospital, you will be given guidelines for caring for him or her. In general:  · Follow the healthcare provider's instructions for giving any prescribed medicines to your child.  · Stay with your loved one or have another responsible adult look after him or her. Watch carefully for any return of symptoms or changes in behavior.  · If the person has diabetes, make sure that any approved medicines are given on time and as prescribed.  Follow-up care  Follow up with the healthcare provider or our staff.  When to seek medical advice  Call the healthcare provider right away for any of the following:  · Symptoms of altered LOC return  · New symptoms appear  Date Last Reviewed: 8/23/2015 © 2000-2017 [employer]. [address] [place] [address], [place] [address]. All rights reserved. This information is not intended as a substitute for professional medical  care. Always follow your healthcare professional's instructions.        When Your Child Has Congestive Heart Failure (CHF)  Congestive heart failure (CHF) is a condition in which the heart does not pump as well as it should. When this happens, fluid can build up in the lungs or body tissues (congestion). CHF can cause lung problems, organ failure, and other serious problems in the body. CHF can usually be treated, but it is important to find out the underlying cause. Your childs healthcare provider will evaluate your childs heart and discuss treatment options with you.  What causes congestive heart failure?  CHF often develops in children with certain heart defects present at birth (congenital heart defects). These include defects such as holes in the heart, which cause an increased amount of blood flow from one side of the heart to the other. This changes the dynamics of blood flow and can cause one side of the heart to become weaker. The heart then is unable to support the blood flow resulting in worsening heart function. CHF can also be caused by other types of heart problems such as cardiomyopathy, a condition in which the hearts pumping function is impaired. Some non-heart problems, such as kidney failure, can lead to CHF due to changes in the body's fluid balance or hormone changes that lead to high blood pressure.    What are the symptoms of CHF?  Symptoms vary but may include:  · Swelling (edema) in the face, abdomen, ankles, or feet  · Shortness of breath, rapid breathing, wheezing, or excessive coughing  · Sweating  · Weakness or tiredness  · Poor feeding and weight gain (in infants)  · Racing heartbeat  · Wheezing  · Abdominal pain and nausea  In older children, symptoms may also include:  · Weight loss  · Passing out  · Chest pain  · Tiring easily during exercise  How is the cause of congestive heart failure diagnosed?  Heart problems in children are usually diagnosed and treated by a pediatric  cardiologist. This is a doctor who specializes in diagnosing and treating children's heart disease. The cardiologist will do a physical exam and ask about your childs health history. The following tests may be done to find the underlying cause of CHF:  · Chest X-ray. This test takes a picture of the heart and lungs. The picture can show your childs heart size and shape. This picture also shows the fluid status of your child's lungs, which can be a clue to the heart's function.  · Electrocardiogram (ECG or EKG). During this test, the electrical activity of the heart is recorded to check for heart rhythm problems (arrhythmias) or problems with heart structure.  · Echocardiogram (echo). During this test, sound waves (ultrasound) are used to create a picture of the heart. This test can show problems with heart structure or function. This includes showing how well the heart pumps, if the heart is enlarged, the direction and strength of blood flow, or if there are any valve problems.  · Lab tests. For these tests, blood and urine samples are taken to check for problems in the kidneys or other organs.  How is congestive heart failure treated?  Specific treatment for your child depends on the cause of CHF. If the cause of CHF in your child is a congenital heart defect, a catheter or surgical procedure may be done to repair the defect.  · Medicines are often prescribed to help manage your childs symptoms. These can include:  ¨ Diuretics help rid the body of excess water. This reduces fluid in the lungs and may improve breathing. These are very important in helping manage fluid status in heart failure.  ¨ Digoxin helps the heart pump blood with more force. This improves the hearts performance.  ¨ ACE inhibitors make blood vessels relax and allow blood to flow more easily from the heart.   ¨ Angiotensin receptor blockers or ARBs are very similar to ACE inhibitors. They may be used in a child who can't take an ACE  inhibitor.  ¨ Beta-blockers lower blood pressure and slow heart rate by altering hormones that can damage the heart. Beta-blockers can also improve the hearts pumping action over time.  · Pacemaker. Some children with heart failure need an artificial pacemaker. The pacemaker may help when the heart is not pumping well because of a slow heartbeat.  · Cardiac resynchronization therapy. This uses a special type of pacemaker that paces both pumping chambers of the heart at the same time to coordinate contractions and improve the heart's function. This treatment may be used in some children with long-term heart failure.  · Heart transplant. This is when the diseased heart is replaced with a healthy heart from a donor. This is only an option for very serious disease. And, it often takes some time to find a suitable heart. In some cases, a child may be able to be helped with devices that help the heart pump while he or she waits for a transplant.  Your child may benefit from seeing a nutritionist to help with nutrition for growth problems and to help balance fluids. He or she may also participate in an exercise rehab program to help with the ability to be active.  What are the long-term concerns?  The outcome for a child with CHF depends on many factors, including the underlying heart problem. The cardiologist will discuss your childs condition, treatment options, and potential outcomes with you.  Date Last Reviewed: 3/6/2016  © 4307-7272 [employer]. [address] [place] [address], [place] [address]. All rights reserved. This information is not intended as a substitute for professional medical care. Always follow your healthcare professional's instructions.

## 2018-07-22 ENCOUNTER — NURSE TRIAGE (OUTPATIENT)
Dept: ADMINISTRATIVE | Facility: CLINIC | Age: 23
End: 2018-07-22

## 2018-07-22 NOTE — TELEPHONE ENCOUNTER
"    Reason for Disposition   Health Information question, no triage required and triager able to answer question   Heart beating very slowly (e.g., < 50 / minute)  (Exception: athlete)    Answer Assessment - Initial Assessment Questions  1. REASON FOR CALL or QUESTION: "What is your reason for calling today?" or "How can I best help you?" or "What question do you have that I can help answer?"      Normal HR    Protocols used: ST INFORMATION ONLY CALL-A-AH, ST HEART RATE AND HEARTBEAT [id number]  what is the normal HR.  C/O chest pain SOB  Mom then states his last HR was 48 and he is feeling tired.  "

## 2018-07-23 ENCOUNTER — NURSE TRIAGE (OUTPATIENT)
Dept: ADMINISTRATIVE | Facility: CLINIC | Age: 23
End: 2018-07-23

## 2018-07-23 ENCOUNTER — HOSPITAL ENCOUNTER (EMERGENCY)
Facility: HOSPITAL | Age: 23
Discharge: HOME | End: 2018-07-24
Attending: EMERGENCY MEDICINE

## 2018-07-23 DIAGNOSIS — R51.9 NONINTRACTABLE HEADACHE, UNSPECIFIED CHRONICITY PATTERN, UNSPECIFIED HEADACHE TYPE: Primary | ICD-10-CM

## 2018-07-23 PROCEDURE — 96375 TX/PRO/DX INJ NEW DRUG ADDON: CPT

## 2018-07-23 PROCEDURE — 99284 EMERGENCY DEPT VISIT MOD MDM: CPT | Mod: 25

## 2018-07-23 PROCEDURE — 96374 THER/PROPH/DIAG INJ IV PUSH: CPT

## 2018-07-23 RX ORDER — METOCLOPRAMIDE HYDROCHLORIDE 5 MG/ML
10 INJECTION INTRAMUSCULAR; INTRAVENOUS
Status: COMPLETED | OUTPATIENT
Start: 2018-07-23 | End: 2018-07-24

## 2018-07-23 RX ORDER — DIPHENHYDRAMINE HYDROCHLORIDE 50 MG/ML
25 INJECTION INTRAMUSCULAR; INTRAVENOUS
Status: COMPLETED | OUTPATIENT
Start: 2018-07-23 | End: 2018-07-24

## 2018-07-23 RX ORDER — KETOROLAC TROMETHAMINE 30 MG/ML
30 INJECTION, SOLUTION INTRAMUSCULAR; INTRAVENOUS
Status: COMPLETED | OUTPATIENT
Start: 2018-07-23 | End: 2018-07-24

## 2018-07-24 VITALS
DIASTOLIC BLOOD PRESSURE: 68 MMHG | WEIGHT: 150 LBS | TEMPERATURE: 98 F | BODY MASS INDEX: 22.22 KG/M2 | OXYGEN SATURATION: 98 % | HEIGHT: 69 IN | HEART RATE: 69 BPM | SYSTOLIC BLOOD PRESSURE: 127 MMHG | RESPIRATION RATE: 20 BRPM

## 2018-07-24 LAB
ALBUMIN SERPL BCP-MCNC: 4.8 G/DL
ALP SERPL-CCNC: 76 U/L
ALT SERPL W/O P-5'-P-CCNC: 44 U/L
ANION GAP SERPL CALC-SCNC: 13 MMOL/L
AST SERPL-CCNC: 30 U/L
BASOPHILS # BLD AUTO: 0.02 K/UL
BASOPHILS NFR BLD: 0.3 %
BILIRUB SERPL-MCNC: 1.7 MG/DL
BUN SERPL-MCNC: 19 MG/DL
CALCIUM SERPL-MCNC: 10.2 MG/DL
CHLORIDE SERPL-SCNC: 100 MMOL/L
CO2 SERPL-SCNC: 24 MMOL/L
CREAT SERPL-MCNC: 0.9 MG/DL
DIFFERENTIAL METHOD: NORMAL
EOSINOPHIL # BLD AUTO: 0.1 K/UL
EOSINOPHIL NFR BLD: 1.7 %
ERYTHROCYTE [DISTWIDTH] IN BLOOD BY AUTOMATED COUNT: 12.9 %
EST. GFR  (AFRICAN AMERICAN): >60 ML/MIN/1.73 M^2
EST. GFR  (NON AFRICAN AMERICAN): >60 ML/MIN/1.73 M^2
GLUCOSE SERPL-MCNC: 98 MG/DL
HCT VFR BLD AUTO: 50.6 %
HGB BLD-MCNC: 17.5 G/DL
LYMPHOCYTES # BLD AUTO: 2.2 K/UL
LYMPHOCYTES NFR BLD: 29 %
MCH RBC QN AUTO: 30.3 PG
MCHC RBC AUTO-ENTMCNC: 34.6 G/DL
MCV RBC AUTO: 88 FL
MONOCYTES # BLD AUTO: 0.5 K/UL
MONOCYTES NFR BLD: 6.2 %
NEUTROPHILS # BLD AUTO: 4.7 K/UL
NEUTROPHILS NFR BLD: 62.1 %
PLATELET # BLD AUTO: 338 K/UL
PMV BLD AUTO: 9.7 FL
POTASSIUM SERPL-SCNC: 4 MMOL/L
PROT SERPL-MCNC: 8.9 G/DL
RBC # BLD AUTO: 5.77 M/UL
SODIUM SERPL-SCNC: 137 MMOL/L
WBC # BLD AUTO: 7.59 K/UL

## 2018-07-24 PROCEDURE — 63600175 PHARM REV CODE 636 W HCPCS: Performed by: EMERGENCY MEDICINE

## 2018-07-24 PROCEDURE — 80053 COMPREHEN METABOLIC PANEL: CPT

## 2018-07-24 PROCEDURE — 85025 COMPLETE CBC W/AUTO DIFF WBC: CPT

## 2018-07-24 RX ADMIN — METOCLOPRAMIDE 10 MG: 5 INJECTION, SOLUTION INTRAMUSCULAR; INTRAVENOUS at 12:07

## 2018-07-24 RX ADMIN — DIPHENHYDRAMINE HYDROCHLORIDE 25 MG: 50 INJECTION, SOLUTION INTRAMUSCULAR; INTRAVENOUS at 12:07

## 2018-07-24 RX ADMIN — KETOROLAC TROMETHAMINE 30 MG: 30 INJECTION, SOLUTION INTRAMUSCULAR at 12:07

## 2018-07-24 NOTE — TELEPHONE ENCOUNTER
"    Reason for Disposition   No answer.  First attempt to contact caller.  Follow-up call scheduled within 15 minutes.   Already left for the hospital/clinic.    Protocols used: ST NO CONTACT OR DUPLICATE CONTACT CALL-A-    Second call, reached Eliu's mom, Jo.  She states they are in the ED now.  They came there because of his report of severe headache.  Of note, she called yesterday also, for pulse of 48 and feeling tired, with CP and SOB.  She was advised at that time to bring him to the ED, but did not, because "I checked his pulse again and it was better."   Message to Dang Farley MD, cardiology, and Tk Goode PA-C in family medicine.  He is scheduled with these providers for follow-up hospital visits, but has not yet been seen by them in clinic.  Please contact caller directly with any additional care advice.    "

## 2018-07-24 NOTE — ED PROVIDER NOTES
Encounter Date: 7/23/2018    SCRIBE #1 NOTE: I, Kwabena Arceo, am scribing for, and in the presence of,  Dr. Bobby Larry. I have scribed the entire note.       History     Chief Complaint   Patient presents with    Headache     severe lt. frontal headache and pain behind lt. eye onset approx. 1hr ago. reports pain as severe. recent admit for drug overdose.      Eliu Lopez is a 23 y.o. male who  has no past medical history on file.    Patient presents to the ED due to headache x1 hour. Onset was while patient was watching TV, has not had a headache this severe before. Pain in back of head, over L eye with some blurriness and light sensitivity. Associated nausea, no vomiting. He has had diaphoresis at night, otherwise no fevers/chills. No hx of chronic headaches, migraine.        The history is provided by the patient.     Review of patient's allergies indicates:   Allergen Reactions    Penicillins Rash     History reviewed. No pertinent past medical history.  History reviewed. No pertinent surgical history.  History reviewed. No pertinent family history.  Social History   Substance Use Topics    Smoking status: Current Every Day Smoker    Smokeless tobacco: Never Used    Alcohol use Not on file     Review of Systems   Constitutional: Negative for fever.   HENT: Negative for sore throat.    Eyes: Positive for photophobia (L) and visual disturbance (L).   Respiratory: Negative for shortness of breath.    Cardiovascular: Negative for chest pain.   Gastrointestinal: Positive for nausea.   Genitourinary: Negative for dysuria.   Musculoskeletal: Negative for back pain.   Skin: Negative for rash.   Neurological: Positive for headaches. Negative for weakness.   Hematological: Does not bruise/bleed easily.       Physical Exam     Initial Vitals [07/23/18 2318]   BP Pulse Resp Temp SpO2   115/64 64 20 98.4 °F (36.9 °C) 99 %      MAP       --         Physical Exam    Nursing note and vitals  reviewed.  Constitutional: He appears well-developed and well-nourished.   HENT:   Head: Normocephalic and atraumatic.   Mouth/Throat: Oropharynx is clear and moist.   Eyes: Conjunctivae and EOM are normal. Pupils are equal, round, and reactive to light.   Pupils 3 mm b/l   Neck: Normal range of motion. Neck supple.   No meningismus   Cardiovascular: Normal rate, regular rhythm, normal heart sounds and intact distal pulses. Exam reveals no gallop and no friction rub.    No murmur heard.  Pulmonary/Chest: Breath sounds normal. No stridor. No respiratory distress. He has no wheezes. He has no rhonchi. He has no rales. He exhibits no tenderness.   Abdominal: Soft. Bowel sounds are normal. He exhibits no distension. There is no tenderness. There is no rebound and no guarding.   Musculoskeletal: Normal range of motion.   Neurological: He is alert and oriented to person, place, and time. He has normal strength. No cranial nerve deficit.   Cranial nerves intact   Skin: Skin is warm and dry.   Psychiatric: He has a normal mood and affect.         ED Course   Procedures  Labs Reviewed   COMPREHENSIVE METABOLIC PANEL - Abnormal; Notable for the following:        Result Value    Total Protein 8.9 (*)     Total Bilirubin 1.7 (*)     All other components within normal limits   CBC W/ AUTO DIFFERENTIAL          Imaging Results          CT Head Without Contrast (Final result)  Result time 07/24/18 00:38:54    Final result by Priscila Davenport MD (07/24/18 00:38:54)                 Impression:      No acute intracranial abnormalities identified.      Electronically signed by: Priscila Davenport MD  Date:    07/24/2018  Time:    00:38             Narrative:    EXAMINATION:  CT HEAD WITHOUT CONTRAST    CLINICAL HISTORY:  Headache, acute, norm neuro exam;    TECHNIQUE:  Low dose axial images were obtained through the head.  Coronal and sagittal reformations were also performed. Contrast was not administered.    COMPARISON:  Recent CT head  from 07/14/2018.    FINDINGS:  The brain is normally formed and exhibits normal density throughout with no indication of acute/recent major vascular distribution cerebral infarction, intraparenchymal hemorrhage, or intra-axial space occupying lesion. The ventricular system is normal in size and configuration with no evidence of hydrocephalus. No effacement of the skull-base cisterns. No abnormal extra-axial fluid collections or blood products. The visualized paranasal sinuses and mastoid air cells are clear. The calvarium shows no significant abnormality.                                 Medical Decision Making:   Clinical Tests:   Lab Tests: Ordered and Reviewed  The following lab test(s) were unremarkable: CBC and CMP  Radiological Study: Ordered and Reviewed  ED Management:  23-year-old male with a headache. Head CT is unremarkable. Patient has no meningeal signs and cranial nerves are intact. Headache was relieved with intravenous Toradol, Reglan and Benadryl.  Patient is discharged in stable condition and advised to follow up with a primary physician when able but also to return here for any further concerning headaches.                      Clinical Impression:   The encounter diagnosis was Nonintractable headache, unspecified chronicity pattern, unspecified headache type.              I, Dr. Bobby Aquino, personally performed the services described in this documentation. All medical record entries made by the scribe were at my direction and in my presence. I have reviewed the chart and agree that the record reflects my personal performance and is accurate and complete. Bobby Aquino MD.  1:31 AM 07/24/2018                 Bobby Aquino MD  07/24/18 0132

## 2018-07-27 ENCOUNTER — HOSPITAL ENCOUNTER (EMERGENCY)
Facility: HOSPITAL | Age: 23
Discharge: HOME | End: 2018-07-27
Attending: EMERGENCY MEDICINE

## 2018-07-27 VITALS
RESPIRATION RATE: 18 BRPM | SYSTOLIC BLOOD PRESSURE: 117 MMHG | TEMPERATURE: 98 F | BODY MASS INDEX: 22.22 KG/M2 | HEART RATE: 65 BPM | WEIGHT: 150 LBS | HEIGHT: 69 IN | OXYGEN SATURATION: 100 % | DIASTOLIC BLOOD PRESSURE: 63 MMHG

## 2018-07-27 DIAGNOSIS — M79.621 PAIN IN RIGHT UPPER ARM: ICD-10-CM

## 2018-07-27 DIAGNOSIS — M79.603 ARM PAIN: ICD-10-CM

## 2018-07-27 DIAGNOSIS — S46.911A MUSCLE STRAIN OF RIGHT UPPER ARM, INITIAL ENCOUNTER: Primary | ICD-10-CM

## 2018-07-27 PROCEDURE — 93005 ELECTROCARDIOGRAM TRACING: CPT

## 2018-07-27 PROCEDURE — 99284 EMERGENCY DEPT VISIT MOD MDM: CPT

## 2018-07-27 NOTE — ED NOTES
APPEARANCE: Alert, oriented and in no acute distress.  CARDIAC: Normal rate and rhythm. S1S2 noted. Pt denies chest pain. Denies SOB, diaphoresis, or dizziness.   PERIPHERAL VASCULAR: peripheral pulses present. Normal cap refill. No edema. Warm to touch. 2+ radial pulses  RESPIRATORY:Normal rate and effort, breath sounds clear bilaterally throughout chest. Respirations are equal and unlabored no obvious signs of distress.  MUSC: Full ROM. No obvious deformity. Pt has pain to the right shoulder/deltoid. States pain is worse when moving the extremity, and while extending the arm. States moving pain at an 8 out of 10 and resting pain at a 0 out of 10.  SKIN: Skin is warm and dry, normal skin turgor, mucous membranes moist.  NEURO: 5/5 strength major flexors/extensors bilaterally. Sensory intact to light touch bilaterally. [place] coma scale: eyes open spontaneously-4, oriented & converses-5, obeys commands-6. No neurological abnormalities.   MENTAL STATUS: awake, alert and aware of environment.  EYE: No obvious discharge.   ENT: EARS: no obvious drainage. NOSE: no active bleeding.

## 2018-07-27 NOTE — ED PROVIDER NOTES
"Encounter Date: 7/27/2018    SCRIBE #1 NOTE: I, Samanthasandra Azul , am scribing for, and in the presence of, Dr. Lindsey.       History     Chief Complaint   Patient presents with    Arm Pain     Patient presents to the ED with reports of having woken this morning with right arm pain. Denies any chest pain. States having recently been seen and treated for a "drug overdose" and having a "heart condition".        Time seen by provider: 6:10 AM on 07/27/2018    Eliu Lopez is a 23 y.o. male with no pertinent PMHx who presents to the ED with complaints of right arm pain with an onset x 1 hour PTA. The patient states when he woke up this morning he felt a pain in the middle of his right upper arm. He claims that he can feel a "knot" where the pain is located and describes the pain as a tightness. He denies any radiation of the pain. Pain is exacerbated with moving/extending the arm. He reports that he was laying on this arm while he was sleeping during the night. The patient was admitted to the hospital two weeks ago for a possible overdose. He was prescribed blood pressure medication during this time, which he has been in good compliance with. The patient reports that he decided to come in this morning because he read on the internet that arm pain may indicate an issue with his heart. The patient denies associated neck pain, chest pain, SOB, weakness/numbness or any other symptoms at this time. He reports no recent injury or trauma.      The history is provided by the patient.     Review of patient's allergies indicates:   Allergen Reactions    Penicillins Rash     History reviewed. No pertinent past medical history.  History reviewed. No pertinent surgical history.  History reviewed. No pertinent family history.  Social History   Substance Use Topics    Smoking status: Current Every Day Smoker    Smokeless tobacco: Never Used    Alcohol use No     Review of Systems   Constitutional: Negative for activity " change, appetite change, chills, fatigue and fever.   Eyes: Negative for visual disturbance.   Respiratory: Negative for apnea and shortness of breath.    Cardiovascular: Negative for chest pain and palpitations.   Gastrointestinal: Negative for abdominal distention, abdominal pain, diarrhea, nausea and vomiting.   Genitourinary: Negative for difficulty urinating.   Musculoskeletal: Positive for myalgias. Negative for arthralgias, gait problem, neck pain and neck stiffness.        Positive right arm pain    Skin: Negative for pallor, rash and wound.   Neurological: Negative for headaches.   Psychiatric/Behavioral: Negative for agitation.       Physical Exam     Initial Vitals [07/27/18 0523]   BP Pulse Resp Temp SpO2   119/68 71 18 97.7 °F (36.5 °C) 100 %      MAP       --         Physical Exam    Nursing note and vitals reviewed.  Constitutional: He appears well-developed and well-nourished. He is not diaphoretic. No distress.   HENT:   Head: Normocephalic and atraumatic.   Mouth/Throat: Oropharynx is clear and moist.   Eyes: EOM are normal. Pupils are equal, round, and reactive to light.   Neck: Normal range of motion. Neck supple. No tracheal deviation present.   Cardiovascular: Normal rate, regular rhythm, normal heart sounds and intact distal pulses.   Pulmonary/Chest: Breath sounds normal. No stridor. No respiratory distress.   Abdominal: Soft. He exhibits no distension and no mass. There is no tenderness.   Musculoskeletal: Normal range of motion. He exhibits tenderness. He exhibits no edema.        Right upper arm: He exhibits tenderness (mild ). He exhibits no bony tenderness, no swelling and no deformity.   Mild muscular tenderness of the right upper arm. No bruising.    Neurological: He is alert and oriented to person, place, and time. He has normal strength. No cranial nerve deficit or sensory deficit. Coordination and gait normal. GCS eye subscore is 4. GCS verbal subscore is 5. GCS motor subscore is 6.    5/5 strength   Skin: Skin is warm and dry. Capillary refill takes less than 2 seconds. No rash noted.   Psychiatric: He has a normal mood and affect. His behavior is normal. Judgment and thought content normal.         ED Course   Procedures  Labs Reviewed - No data to display  EKG Readings: (Independently Interpreted)   Initial Reading: No STEMI. Previous EKG: Compared with most recent EKG Rhythm: Sinus Arrhythmia.   Normal sinus rhythm with sinus arrhythmia, rate 60, no ST changes, no ischemia, normal intervals.  Compared to prior EKG from 07/2018, grossly stable without significant change.       Imaging Results          X-Ray Humerus 2 View Right (In process)                  Medical Decision Making:   History:   Old Medical Records: I decided to obtain old medical records.  Initial Assessment:   Patient is a 23 y.o. male presenting to ED with right arm pain x1 day.  On arrival, vitals are WNL, exam is unremarkable.   Plan to obtain x-ray R humerus.  Will continue to monitor closely and reassess.    Differential Diagnosis:   Differential Diagnosis includes, but is not limited to:  Fracture, dislocation, compartment syndrome, nerve injury/palsy, vascular injury, rhabdomyolysis, hemarthrosis, septic joint, bursitis, muscle strain, ligament tear/sprain, laceration with foreign body, abrasion, soft tissue contusion, osteoarthritis.    Clinical Tests:   Radiological Study: Reviewed and Ordered  Medical Tests: Ordered and Reviewed  ED Management:  EKG stable without acute findings.  X-ray right humerus negative for acute bony abnormality.  The symptoms most consistent with mild muscle strain.  Consult symptomatic and supportive care including RICE instructions.  Recommend close follow-up with PCP for further evaluation and management.  Return precautions discussed including worsening pain, swelling, redness, rash, or any other concerning symptoms.  Upon re-evaluation, the patient's status has remained stable.  After  complete ED evaluation, clinical impression is most consistent with muscle strain.  At this time, I feel there is no emergent condition requiring further evaluation or admission. I believe the patient is stable for discharge from the ED. The patient and any additional family present were updated with test results, overall clinical impression, and recommended further plan of care. All questions were answered. The patient expressed understanding and agreed with current plan for discharge with PCP follow-up within 1 week. Strict return precautions were provided, including return/worsening of current symptoms or any other concerns.               Scribe Attestation:   Scribe #1: I performed the above scribed service and the documentation accurately describes the services I performed. I attest to the accuracy of the note.               Clinical Impression:   The primary encounter diagnosis was Muscle strain of right upper arm, initial encounter. Diagnoses of Arm pain and Pain in right upper arm were also pertinent to this visit.      Disposition:   Disposition: Discharged  Condition: Stable    I, Dr. Zelalem Lindsey, personally performed the services described in this documentation. All medical record entries made by the scribe were at my direction and in my presence.  I have reviewed the chart and agree that the record reflects my personal performance and is accurate and complete.     Zelalem Lindsey MD.  6:43 AM 07/27/2018               Zelalem Lindsey MD  07/27/18 0705

## 2018-11-02 NOTE — H&P
IDENTIFICATION DATA:  This is a 23-year-old single  male who was brought   to ER due to opioid overdose.  This consult is requested by  ____ for   overdose.  The patient is not on a PEC status.    HISTORY OF PRESENT ILLNESS:  According to notes, the patient was found   unresponsive at home and was found positive for opioid and marijuana.  He   required Narcan, which helped.  He has developed acute respiratory distress and   hypoxia and was intubated.  He was extubated on 07/16/2018.  He had a   cardiogenic shock followed by congestive heart failure.  He was found to have   ventricular escape rhythm.  He had rhabdomyolysis.  He is now on the floor.  The   patient knows that he was abusing Xanax and opioid but last time he remembered   it was about a week ago.  He does not know if something was laced with his   marijuana or what.  He does not remember shooting heroin or excessive dose of   pain pills.  The patient has regrets about his action and was humiliated in   front of family.  The patient states that he learned his lesson.  The patient   states that he has no chest pain, but he knows that he has to be careful about   his heart.  He is not able to sleep.  He is alert, coherent, organized and   denies any hallucinations, delusions or confusion.  The patient denies use of   alcohol.  He smokes a couple of joints and twice a week.  He believes that he   started using those things last year due to bad company.  His friends were using   it and he got induced with that.  The patient denies any stress.  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  He has no financial problem.  The patient   is positive for opioids and marijuana only.    PAST PSYCHIATRIC HISTORY:  The patient has no previous in or outpatient   psychiatric treatment.  He has no history of suicide or homicide.  He has never   been seen by a psychiatrist.  He is not on any psychotropics.  He denies   rehabilitation or legal  Please call in rx for pt  problems.    SOCIAL AND FAMILY HISTORY:  The patient was born in [place].  He came to [place] when   he was 1-1/2 year old.  He has high school diploma.  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  He is single and has no children.  He lives with his parents.  He denies   family history of mental illness, suicide or drug problem.    MEDICAL HISTORY:  The patient is extubated.  He had acute respiratory failure   and cardiogenic shock.  His CPK was 2043 upon arrival and .  His   potassium was 5.9.  His prolactin was 12.8.  His blood pressure is 124/62 with   normal pulse.  His oxygen saturation is 94.  His bilirubin is 2.2, AST 27, ALT   26.  Sodium 134, potassium is now 3.1, it was 5.7.    MENTAL STATUS EXAMINATION:  This is a 23-year-old,  male who is fluent   in English, exhibited fair eye contact.  He is alert, cooperative and oriented   to day, date, month and year.  Mood is euthymic with appropriate affect.  He is   attentive and organized.  No tremors, respiratory distress or elevated jugular   vein noted.  He is able to recall 3 objects out of 3 immediately, 3 out of 3   after 5 minutes and events of the past.  He denies intentional overdose.  He   believes that it was accidental thing.  He has no intention to harm to self or   others.  Insight and judgment are fair.  He is of average intelligent person.    He has good impulse control.  No psychosis noted.    PSYCHIATRIC DIAGNOSES:  AXIS I:  Opioid intoxication, opioid use disorder, mild without perceptual   disturbance, cannabis use disorder, mild without perceptual disturbance.  AXIS II:  No diagnosis.  AXIS III:  Congestive heart failure, status post respiratory failure.  AXIS IV:  Drug problem.  AXIS V:  35.    RECOMMENDATIONS:  The patient is now stable.  He has no confusion, psychosis or   major depression.  I spoke with mom who is in room who preferred him to be   discharged.  The patient does not want to go to the hospital.  He does not   believe that he  needs any medicine and he learned his lesson and he will not be   around those people.    ASSETS:  The patient is verbal, cooperative, cognitively intact and has   supportive family.  The patient is employed.    We will discharge this patient to home when medically stable.  The patient   refused the option of medications and outpatient rehabilitation services because   he learned his lesson.  He is not going to do it and he would involve more in   the family.      JOLANTA/IN  dd: 07/18/2018 10:16:01 (CDT)  td: 07/18/2018 17:02:21 (CDT)  Doc ID   #[id number]  Job ID #[id number]    CC:

## 2023-05-01 DIAGNOSIS — A74.9 CHLAMYDIA: Primary | ICD-10-CM

## 2023-05-01 RX ORDER — AZITHROMYCIN 500 MG/1
1000 TABLET, FILM COATED ORAL ONCE
Qty: 2 TABLET | Refills: 0 | Status: SHIPPED | OUTPATIENT
Start: 2023-05-01 | End: 2023-05-01

## 2023-06-16 NOTE — PROGRESS NOTES
Group Topic: BH Activity Group    Date: 6/15/2023  Start Time: 2000  End Time: 2100  Facilitators: [facility]    Focus: DHIRAJ   Number in attendance: 7    Method: Group   Attendance: Offered group but refused group      [facility]  Progress Note    Subjective:       Chief Complaint/Reason for Admission: Drug Overdose    Interval History: Off sedation.  Responds to verbal and tactile stimuli.  SBT today.  I spoke to mother at bedside.  Mother reports she does not know if he used hydrocarbs in the past or if he has any friends that use those meds.  Mother reports she only knew about him smoking marijuana but does not know for howl long he's been smoking.  Also, she did not know how long he's been smoking marijuana.  Educated mother on acute heart failure of unknown etiology.      Review of Systems:  Unable to perform secondary to the patient clinical condition. The patient was intubated during the encounter.     Patient Active Problem List    Diagnosis Date Noted    Acute systolic heart failure 07/16/2018    Respiratory failure 07/14/2018    Polysubstance abuse     Acute respiratory failure with hypoxia and hypercarbia     Shock      History reviewed. No pertinent past medical history.   History reviewed. No pertinent surgical history.   No prescriptions prior to admission.     Review of patient's allergies indicates:   Allergen Reactions    Penicillins Rash      Social History   Substance Use Topics    Smoking status: Current Every Day Smoker    Smokeless tobacco: Never Used    Alcohol use Not on file      History reviewed. No pertinent family history.       OBJECTIVE:     Vital signs in last 24 hours:  Temp:  [98 °F (36.7 °C)-99.4 °F (37.4 °C)] 98.8 °F (37.1 °C)  Pulse:  [] 88  Resp:  [9-50] 10  SpO2:  [89 %-100 %] 90 %  BP: ()/(48-68) 114/60  Arterial Line BP: ()/(53-89) 122/73    Constitutional: He appears well-developed.    General: The patient is intubated.  HENT:   Neck: supple w/o JVD  Head: Normocephalic and atraumatic.   Eyes: Pupils were symmetric and approximately 6 mm in size.   Neck: No traumatic lesions noted.   Cardiovascular: Sinus rhythm.  Exam reveals no gallop, murmurs, and no friction rub.  +Tachycardia.  Pulmonary/Chest: Mechanical breath sounds.  Abdominal: Soft. He exhibits no distension.    Musculoskeletal: unable to access adequately secondary to his current clinical condition.   Neurological: Sedated  Skin: Skin is warm and dry. No rashes noted.     Data Review:   CBC:   Lab Results   Component Value Date    WBC 10.56 07/14/2018    RBC 5.21 07/14/2018    HGB 15.8 07/14/2018    HCT 48.2 07/14/2018     07/14/2018     BMP:   Lab Results   Component Value Date     (H) 07/16/2018     (H) 07/16/2018    K 4.0 07/16/2018     (H) 07/16/2018    CO2 26 07/16/2018    BUN 11 07/16/2018    CREATININE 0.8 07/16/2018    CALCIUM 8.7 07/16/2018     Coagulation:   Lab Results   Component Value Date    INR 1.3 (H) 07/14/2018    APTT 27.4 07/14/2018     Cardiac markers: No results found for: CKMB, TROPONINT, MYOGLOBIN  ABGs:   Lab Results   Component Value Date    PH 7.482 (H) 07/16/2018    PO2 145 (H) 07/16/2018    PCO2 35.4 07/16/2018     Radiology review:   X-Ray Chest 1 View  New right internal jugular central venous catheter tip overlies the SVC.  No pneumothorax.    CT Head Without Contrast  No acute abnormality.    X-Ray Chest AP Portable  Diffuse hazy airspace disease    ASSESSMENT/PLAN:     Active Hospital Problems    Diagnosis  POA    *Acute respiratory failure with hypoxia and hypercarbia [J96.01, J96.02]  Unknown    Acute systolic heart failure [I50.21]  Unknown    Respiratory failure [J96.90]  Yes    Polysubstance abuse [F19.10]  Unknown    Shock [R57.9]  Unknown      Resolved Hospital Problems    Diagnosis Date Resolved POA   No resolved problems to display.     Mr. Eliu Herrera w/ no significant PMHx presented to the ED in respiratory failure secondary to possible drug overdose.    NEURO-PSYCH:   -Drug panel positive for opioids and THC   -possible ingestion of ecstacy as well  -intubated and sedated with fentanyl and propofol     CV   Hypotension likely secondary to  Cardiogenic Shock  -unknown cause of cardiogenic cause.  Mother does not know if he takes hydrocabons but he did take ecstasy which could be laced with different chemicals.    -Will attempt to gather more info once he's off the ventilator   - Landers in place to monitor I/O, Landers to gravity  - Currently on Norepi (Pressor)  - Continue telemetry  - Keep MAP >65 mm Hg  - Keep Mg 2, K 4  - LA 8 -->3.1  - Per Cardiology, will require a BB and ACEI when more stable   -NSTEMI 2/2 demand mismatch  -EF of 25-30%     PULM:  Respiratory failure  -SBT today   - Intubated in the ED  - Consulted Pulmonary/Critical Care and appreciate recommendations  - Continue to monitor ABG's  - F/u blood cxs/ urine cxs, placed on broad spectrum abxs: Vanco and Zosyn  - Neuro checks q 4 hours  - Continue Pulse Ox     RENAL:  FLAKITO improving iwt fluids  CPK level elevated- continue to trend  UOP adequate    Endo:  -SSI    FEN-GI:  Lytes repleated     HEME:   -No acute issues at this time     ID:  -Vanc and zosyn started   -Urine culture and UA ordered  -Blood culture ordered- negative preliminary  -Urine culture ordered  -Procalc 12.87    PPX  -SCD    Dispo:  SBT today,  Need to gather info about     Eddi Bazan MD  [phone]

## 2023-08-01 ENCOUNTER — HOSPITAL ENCOUNTER (EMERGENCY)
Facility: HOSPITAL | Age: 28
Discharge: HOME | End: 2023-08-01
Attending: EMERGENCY MEDICINE

## 2023-08-01 ENCOUNTER — OFFICE VISIT (OUTPATIENT)
Dept: URGENT CARE | Facility: CLINIC | Age: 28
End: 2023-08-01

## 2023-08-01 VITALS
OXYGEN SATURATION: 98 % | SYSTOLIC BLOOD PRESSURE: 139 MMHG | DIASTOLIC BLOOD PRESSURE: 86 MMHG | TEMPERATURE: 98 F | RESPIRATION RATE: 20 BRPM | HEART RATE: 62 BPM

## 2023-08-01 VITALS
HEART RATE: 65 BPM | SYSTOLIC BLOOD PRESSURE: 114 MMHG | OXYGEN SATURATION: 97 % | TEMPERATURE: 98 F | BODY MASS INDEX: 22.22 KG/M2 | HEIGHT: 69 IN | RESPIRATION RATE: 20 BRPM | DIASTOLIC BLOOD PRESSURE: 69 MMHG | WEIGHT: 150 LBS

## 2023-08-01 DIAGNOSIS — T15.01XA FOREIGN BODY OF RIGHT CORNEA, INITIAL ENCOUNTER: ICD-10-CM

## 2023-08-01 DIAGNOSIS — S05.01XA ABRASION OF RIGHT CORNEA, INITIAL ENCOUNTER: Primary | ICD-10-CM
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
## 2023-08-01 DIAGNOSIS — T15.91XA FOREIGN BODY, EYE, RIGHT, INITIAL ENCOUNTER: ICD-10-CM

## 2023-08-01 PROCEDURE — 99284 EMERGENCY DEPT VISIT MOD MDM: CPT | Mod: 27

## 2023-08-01 PROCEDURE — 65220 REMOVE FOREIGN BODY FROM EYE: CPT | Mod: RT

## 2023-08-01 PROCEDURE — 99214 PR OFFICE/OUTPT VISIT, EST, LEVL IV, 30-39 MIN: ICD-10-PCS | Mod: S$GLB,,, | Performed by: PHYSICIAN ASSISTANT

## 2023-08-01 PROCEDURE — 99283 EMERGENCY DEPT VISIT LOW MDM: CPT

## 2023-08-01 PROCEDURE — 99214 OFFICE O/P EST MOD 30 MIN: CPT | Mod: S$GLB,,, | Performed by: PHYSICIAN ASSISTANT

## 2023-08-01 PROCEDURE — 25000003 PHARM REV CODE 250: Performed by: PHYSICIAN ASSISTANT

## 2023-08-01 RX ORDER — OFLOXACIN 3 MG/ML
1 SOLUTION/ DROPS OPHTHALMIC 4 TIMES DAILY
Qty: 5 ML | Refills: 0 | Status: SHIPPED | OUTPATIENT
Start: 2023-08-01 | End: 2023-08-08

## 2023-08-01 RX ORDER — PROPARACAINE HYDROCHLORIDE 5 MG/ML
1 SOLUTION/ DROPS OPHTHALMIC
Status: COMPLETED | OUTPATIENT
Start: 2023-08-01 | End: 2023-08-01

## 2023-08-01 RX ORDER — PROPARACAINE HYDROCHLORIDE 5 MG/ML
1 SOLUTION/ DROPS OPHTHALMIC
Status: DISCONTINUED | OUTPATIENT
Start: 2023-08-01 | End: 2023-08-01 | Stop reason: HOSPADM

## 2023-08-01 RX ORDER — ERYTHROMYCIN 5 MG/G
OINTMENT OPHTHALMIC 4 TIMES DAILY
Qty: 1 G | Refills: 0 | Status: SHIPPED | OUTPATIENT
Start: 2023-08-01

## 2023-08-01 RX ADMIN — PROPARACAINE HYDROCHLORIDE 1 DROP: 5 SOLUTION/ DROPS OPHTHALMIC at 07:08

## 2023-08-01 RX ADMIN — FLUORESCEIN SODIUM 1 EACH: 1 STRIP OPHTHALMIC at 07:08

## 2023-08-01 NOTE — PATIENT INSTRUCTIONS
PLEASE READ YOUR DISCHARGE INSTRUCTIONS ENTIRELY AS IT CONTAINS IMPORTANT INFORMATION.  - use eyedrops in affected eye as directed for 7 days.    - continue heat/ice compression as needed.  Do not touch eyes with hand. May wipe eyes with tissue and discard after each use.  - Do not wear contacts until infection is completely cleared.  Do not sleep with contacts.  - Tylenol or Ibuprofen as directed as needed for pain.  For Tylenol, do not exceed 4000 mg/ day. For ibuprofen, do not exceed 2400 mg/day.  - if no improvement or worsening symptoms, recommend follow-up with ophthalmology for further evaluation.  Referral placed.  - If you smoke, please stop smoking.    -You must understand that you've received an Urgent Care treatment only and that you may be released before all your medical problems are known or treated. You, the patient, will arrange for follow up care as instructed. Please arrange follow up with your primary medical clinic within 2-5 days if your signs and symptoms have not resolved or worsen.   - Follow up with your PCP or specialty clinic as directed.  You can call [phone] or [phone] to schedule an appointment with the appropriate provider.    - If your condition worsens or fails to improve, we recommend that you receive another evaluation at the emergency room immediately or contact your primary medical clinic to discuss your concerns  in next 2-5 days.  Strict clinic versus ER precautions given.     RED FLAGS/WARNING SYMPTOMS DISCUSSED WITH PATIENT THAT WOULD WARRANT EMERGENT MEDICAL ATTENTION. Patient aware and verbalized understanding.  Home care  Use prescribed antibiotic eye drops or ointment as directed to treat the infection.  Apply a warm compress (towel soaked in warm water) to the affected eye 3 to 4 times a day. Do this just before applying medicine to the eye.  Use a warm, wet cloth to wipe away crusting of the eyelids in the morning. This is caused by mucus drainage during  the night. You may also use saline irrigating solution or artificial tears to rinse away mucus in the eye. Do not put a patch over the eye.  Wash your hands before and after touching the infected eye. This is to prevent spreading the infection to the other eye, and to other people. Do not share your towels or washcloths with others.  You may use acetaminophen or ibuprofen to control pain, unless another medicine was prescribed. (Note: If you have chronic liver or kidney disease or have ever had a stomach ulcer or gastrointestinal bleeding, talk with your doctor before using these medicines.)  Do not wear contact lenses until your eyes have healed and all symptoms are gone.  Follow-up care  Follow up with your healthcare provider, or as advised.  When to seek medical advice  Call your healthcare provider right away if any of these occur:  Worsening vision  Increasing pain in the eye  Increasing swelling or redness of the eyelid  Redness spreading around the eye  Date Last Reviewed: 6/14/2015  © 6854-8422 [employer]. [address] [place] [address], [place] [address]. All rights reserved. This information is not intended as a substitute for professional medical care. Always follow your healthcare professional's instructions.

## 2023-08-01 NOTE — PROGRESS NOTES
"Subjective:      Patient ID: Eliu Lopez is a 28 y.o. male.    Vitals:  height is 5' 9" (1.753 m) and weight is 68 kg (150 lb). His temperature is 98 °F (36.7 °C). His blood pressure is 114/69 and his pulse is 65. His respiration is 20 and oxygen saturation is 97%.     Chief Complaint: Foreign Body in Eye    28-year-old male who presents urgent care clinic for evaluation of work related right eye injury.  He is self-employed and self-pay.  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  There is no significant pain.  There is mild I soreness and irritation with foreign body sensation.  No vision changes, drainage, fever, chills, photophobia, or any other complaints.  Does not wear contact lens.  Has tried OTC Visine with no significant relief.      Eye Pain   The right eye is affected. This is a new problem. The current episode started today. The problem occurs constantly. The injury mechanism was a foreign body. The pain is at a severity of 0/10. The patient is experiencing no pain. There is No known exposure to pink eye. He Does not wear contacts. Associated symptoms include eye redness and a foreign body sensation. Pertinent negatives include no blurred vision, eye discharge, double vision, fever, itching, nausea, photophobia, recent URI or vomiting. He has tried eye drops for the symptoms.       Constitution: Negative for activity change, chills, sweating, fatigue, fever and generalized weakness.   HENT:  Negative for ear pain, hearing loss, facial swelling, congestion, postnasal drip, sinus pain, sinus pressure, sore throat, trouble swallowing and voice change.    Neck: Negative for neck pain, neck stiffness and painful lymph nodes.   Cardiovascular:  Negative for chest pain, leg swelling, palpitations, sob on exertion and passing out.   Eyes:  Positive for foreign body in eye, eye pain and eye redness. Negative for eye trauma, eye discharge, eye " itching, photophobia, vision loss, double vision, blurred vision and eyelid swelling.   Respiratory:  Negative for chest tightness, cough, sputum production, COPD, shortness of breath, wheezing and asthma.    Gastrointestinal:  Negative for abdominal pain, nausea, vomiting, diarrhea, bright red blood in stool, dark colored stools, rectal bleeding, heartburn and bowel incontinence.   Genitourinary:  Negative for dysuria, frequency, urgency, urine decreased, flank pain, bladder incontinence, hematuria and history of kidney stones.   Musculoskeletal:  Negative for trauma, joint pain, joint swelling, abnormal ROM of joint, muscle cramps and muscle ache.   Skin:  Negative for color change, pale, rash and wound.   Allergic/Immunologic: Negative for seasonal allergies, asthma and immunocompromised state.   Neurological:  Negative for dizziness, history of vertigo, light-headedness, passing out, facial drooping, speech difficulty, coordination disturbances, loss of balance, headaches, disorientation, altered mental status, loss of consciousness, numbness, tingling and seizures.   Hematologic/Lymphatic: Negative for swollen lymph nodes, easy bruising/bleeding and trouble clotting. Does not bruise/bleed easily.   Psychiatric/Behavioral:  Negative for altered mental status and disorientation.       Objective:     Physical Exam   Constitutional: He appears well-developed. He is cooperative.  Non-toxic appearance. He does not appear ill. No distress.      Comments:Well-appearing     HENT:   Head: Normocephalic and atraumatic.   Ears:   Right Ear: Hearing, external ear and ear canal normal.   Left Ear: Hearing, external ear and ear canal normal.   Nose: Nose normal.   Mouth/Throat: Mucous membranes are moist. Oropharynx is clear.   Eyes: Conjunctivae and EOM are normal. Pupils are equal, round, and reactive to light. Right eye exhibits no chemosis, no discharge and no exudate. Foreign body present in the right eye. Left eye  exhibits no chemosis, no discharge and no exudate. Right conjunctiva is not injected. Right conjunctiva has no hemorrhage. Left conjunctiva is not injected. Left conjunctiva has no hemorrhage. Right eye exhibits normal extraocular motion. Left eye exhibits normal extraocular motion.     Extraocular movement intact vision grossly intact gaze aligned appropriately   Neck: Phonation normal. Neck supple.   Cardiovascular: Normal rate and regular rhythm.   Pulmonary/Chest: Effort normal. No accessory muscle usage. No respiratory distress. He has no wheezes.   Abdominal: Normal appearance. He exhibits no distension.   Musculoskeletal:      Right lower leg: No edema.      Left lower leg: No edema.      Comments: Moves all extremities with normal tone, strength, and ROM.    Gait normal.   Neurological: no focal deficit. He is alert and at baseline. He has normal motor skills. He displays no weakness, facial symmetry, normal reflexes and no dysarthria. No cranial nerve deficit or sensory deficit. He exhibits normal muscle tone. Gait and coordination normal. Coordination normal.   Skin: Skin is warm, dry, intact, not diaphoretic and no rash. Capillary refill takes less than 2 seconds.   Psychiatric: His speech is normal and behavior is normal. Mood, affect, judgment and thought content normal.   Nursing note and vitals reviewed.    Vision Screening    Right eye Left eye Both eyes   Without correction 20 20 20 20 20 20   With correction          Assessment:     1. Work related injury    2. Foreign body, eye, right, initial encounter      On exam, patient is nontoxic appearing and vitals are stable.  Patient is essentially neurovascularly intact on exam.   fluorescein eye exam showed no increased uptake or corneal abrasion in eye.  Exam does show pinpoint foreign body imbedded within cornea.  Unable to be removed through irrigation and with cotton Q-tip.    Patient significant improvements with eye symptoms after  irrigation.  We discussed attempting to remove via beveled needle in clinic with post follow-up with Ophthalmology.   We discussed side effects/alternatives/benefits/risk and patient felt risks outweighed benefits.    Patient was prescribed antibiotic eyedrops and recommended OTC treatments for their symptoms.   avoid contact lens use until infection clears. Patient was treated conservatively with activity modification, OTC pain reliever,  and RICE therapy. Patient was referred to Ophthalmology for urgent evaluation.    We discussed referral to occupational health given work-related injury but he declined since he is self-pay.    Patient was instructed to return for re-evaluation for any worsening or change in current symptoms. Strict ED versus clinic precautions given in depth. Discharge and follow-up instructions given verbally/printed with the patient who expressed understanding and willingness to comply with my recommendations.  Patient verbalized understanding and agreed with the entirety of plan of care.    Note dictated with voice recognition software, please excuse any grammatical errors.    Plan:       Work related injury  -     Ambulatory referral/consult to Occupational Medicine    Foreign body, eye, right, initial encounter  -     Ambulatory referral/consult to Ophthalmology  -     ofloxacin (OCUFLOX) 0.3 % ophthalmic solution; Place 1 drop into the right eye 4 (four) times daily. for 7 days  Dispense: 5 mL; Refill: 0             Additional MDM:     Heart Failure Score:   COPD = No      Patient Instructions   PLEASE READ YOUR DISCHARGE INSTRUCTIONS ENTIRELY AS IT CONTAINS IMPORTANT INFORMATION.  - use eyedrops in affected eye as directed for 7 days.    - continue heat/ice compression as needed.  Do not touch eyes with hand. May wipe eyes with tissue and discard after each use.  - Do not wear contacts until infection is completely cleared.  Do not sleep with contacts.  - Tylenol or Ibuprofen as directed  as needed for pain.  For Tylenol, do not exceed 4000 mg/ day. For ibuprofen, do not exceed 2400 mg/day.  - if no improvement or worsening symptoms, recommend follow-up with ophthalmology for further evaluation.  Referral placed.  - If you smoke, please stop smoking.    -You must understand that you've received an Urgent Care treatment only and that you may be released before all your medical problems are known or treated. You, the patient, will arrange for follow up care as instructed. Please arrange follow up with your primary medical clinic within 2-5 days if your signs and symptoms have not resolved or worsen.   - Follow up with your PCP or specialty clinic as directed.  You can call [phone] or [phone] to schedule an appointment with the appropriate provider.    - If your condition worsens or fails to improve, we recommend that you receive another evaluation at the emergency room immediately or contact your primary medical clinic to discuss your concerns  in next 2-5 days.  Strict clinic versus ER precautions given.     RED FLAGS/WARNING SYMPTOMS DISCUSSED WITH PATIENT THAT WOULD WARRANT EMERGENT MEDICAL ATTENTION. Patient aware and verbalized understanding.  Home care  Use prescribed antibiotic eye drops or ointment as directed to treat the infection.  Apply a warm compress (towel soaked in warm water) to the affected eye 3 to 4 times a day. Do this just before applying medicine to the eye.  Use a warm, wet cloth to wipe away crusting of the eyelids in the morning. This is caused by mucus drainage during the night. You may also use saline irrigating solution or artificial tears to rinse away mucus in the eye. Do not put a patch over the eye.  Wash your hands before and after touching the infected eye. This is to prevent spreading the infection to the other eye, and to other people. Do not share your towels or washcloths with others.  You may use acetaminophen or ibuprofen to control pain, unless  another medicine was prescribed. (Note: If you have chronic liver or kidney disease or have ever had a stomach ulcer or gastrointestinal bleeding, talk with your doctor before using these medicines.)  Do not wear contact lenses until your eyes have healed and all symptoms are gone.  Follow-up care  Follow up with your healthcare provider, or as advised.  When to seek medical advice  Call your healthcare provider right away if any of these occur:  Worsening vision  Increasing pain in the eye  Increasing swelling or redness of the eyelid  Redness spreading around the eye  Date Last Reviewed: 6/14/2015  © 5516-2205 [employer]. [address] [place] [address]. All rights reserved. This information is not intended as a substitute for professional medical care. Always follow your healthcare professional's instructions.

## 2023-08-02 ENCOUNTER — HOSPITAL ENCOUNTER (EMERGENCY)
Facility: HOSPITAL | Age: 28
Discharge: HOME | End: 2023-08-02
Attending: EMERGENCY MEDICINE

## 2023-08-02 ENCOUNTER — TELEPHONE (OUTPATIENT)
Dept: OPHTHALMOLOGY | Facility: CLINIC | Age: 28
End: 2023-08-02

## 2023-08-02 VITALS
HEIGHT: 69 IN | RESPIRATION RATE: 18 BRPM | WEIGHT: 149.94 LBS | TEMPERATURE: 99 F | SYSTOLIC BLOOD PRESSURE: 126 MMHG | DIASTOLIC BLOOD PRESSURE: 66 MMHG | OXYGEN SATURATION: 98 % | BODY MASS INDEX: 22.21 KG/M2 | HEART RATE: 62 BPM

## 2023-08-02 DIAGNOSIS — T15.01XA CORNEAL FOREIGN BODY, RIGHT, INITIAL ENCOUNTER: Primary | ICD-10-CM

## 2023-08-02 PROCEDURE — 90715 TDAP VACCINE 7 YRS/> IM: CPT

## 2023-08-02 PROCEDURE — 63600175 PHARM REV CODE 636 W HCPCS

## 2023-08-02 PROCEDURE — 90471 IMMUNIZATION ADMIN: CPT

## 2023-08-02 RX ADMIN — TETANUS TOXOID, REDUCED DIPHTHERIA TOXOID AND ACELLULAR PERTUSSIS VACCINE, ADSORBED 0.5 ML: 5; 2.5; 8; 8; 2.5 SUSPENSION INTRAMUSCULAR at 04:08

## 2023-08-02 NOTE — FIRST PROVIDER EVALUATION
Emergency Department TeleTriage Encounter Note      CHIEF COMPLAINT    Chief Complaint   Patient presents with    Foreign Body in Eye     Pt presents to ed c/o possible debris in right eye after being today at work; might be concrete. 3/10 pain.       VITAL SIGNS   Initial Vitals [08/01/23 1902]   BP Pulse Resp Temp SpO2   139/86 62 20 98.1 °F (36.7 °C) 98 %      MAP       --            ALLERGIES    Review of patient's allergies indicates:   Allergen Reactions    Penicillins Rash       PROVIDER TRIAGE NOTE  Patient presents with complaint of foreign body in the right eye that happened today. Reports no visual changes. Does not wear contacts. Went to  where they discussed getting it out with beveled needle but patient declined and came to ED      Phy:   Constitutional: well nourished, well developed, appearing stated age, NAD   HEENT: NCAT, symmetrical lids, No obvious facial deformity.  Normal phonation. Normal Conjunctiva   Neck: NAROM   Respiratory: Normal effort.  No obvious use of accessory muscles   Musculoskeletal: Moved upper extremities well   Neuro: Alert, answers questions appropriately    Psych: appropriate mood and affect      Initial orders will be placed and care will be transferred to an alternate provider when patient is roomed for a full evaluation. Any additional orders and the final disposition will be determined by that provider.        ORDERS  Labs Reviewed - No data to display    ED Orders (720h ago, onward)      None              Virtual Visit Note: The provider triage portion of this emergency department evaluation and documentation was performed via [employer], a HIPAA-compliant telemedicine application, in concert with a tele-presenter in the room. A face to face patient evaluation with one of my colleagues will occur once the patient is placed in an emergency department room.      DISCLAIMER: This note was prepared with [employer] voice recognition transcription software. Garbled syntax,  mangled pronouns, and other bizarre constructions may be attributed to that software system.

## 2023-08-02 NOTE — ED TRIAGE NOTES
C/o unknown foreign body noted to right eye since earlier today. Reports redness and tearing. Denies other injury.

## 2023-08-02 NOTE — TELEPHONE ENCOUNTER
----- Message from Ken Hay sent at 8/2/2023 12:12 PM CDT -----  Contact: [phone]  Pt is calling because they have no insurance or medicaid and were trying to see if there was some type of payment plan they can do. Please call back to further assist.

## 2023-08-02 NOTE — ED PROVIDER NOTES
Encounter Date: 8/1/2023       History     Chief Complaint   Patient presents with    Foreign Body in Eye     Pt presents to ed c/o possible debris in right eye after being today at work; might be concrete. 3/10 pain.     NILSON Nowak is a 28 y.o. M with no significant PMH presents with R eye pain and foreign body sensation since this morning.  He thinks he has a piece of cement in his eye.  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  He has mild eye pain and mild blurred vision in the eye.  He went to urgent care initially and they tried to wash the eye out unsuccessfully so he came here.  Review of patient's allergies indicates:   Allergen Reactions    Penicillins Rash     History reviewed. No pertinent past medical history.  History reviewed. No pertinent surgical history.  History reviewed. No pertinent family history.  Social History     Tobacco Use    Smoking status: Every Day     Current packs/day: 0.00    Smokeless tobacco: Never   Substance Use Topics    Alcohol use: No    Drug use: Yes     Types: Marijuana     Review of Systems    Physical Exam     Initial Vitals [08/01/23 1902]   BP Pulse Resp Temp SpO2   139/86 62 20 98.1 °F (36.7 °C) 98 %      MAP       --         Physical Exam  General: Awake and alert, well-nourished  HENT: moist mucous membranes  Eyes: + R eye conjunctival injection, small foreign body embedded in  R eye cornea.  Minimal fluorescein uptake at site of embedded foreign body, negative Ela's  Pulm: no increased work of breathing  CV:  Grossly well perfused  Abdomen: Nondistended  MSK: No LE edema  Skin: No rash noted  Neuro: No facial asymmetry, grossly normal movements of arms and legs  Psychiatric: Cooperative    ED Course   Procedures  Labs Reviewed - No data to display       Imaging Results    None          Medications   proparacaine 0.5 % ophthalmic solution 1 drop (1 drop Right Eye Given 8/1/23 1952)   fluorescein ophthalmic strip 1 each (1  each Both Eyes Given 8/1/23 1952)     Medical Decision Making:   ED Management:  There is a small corneal foreign body visualized.  Fluorescein stain unremarkable and without jacquie's sign.   Attempted removal with moistcotton swab partly successful but there is still some part of the object in his eye.  Attempted with blunt side of a bent 30 gauge needle and unable to remove.  We do not currently have a slit lamp in the ED and I do not think it is safe for me to attempt using the sharp part of a needle or a ophthalmic shoshana without the help of a slit lamp.  Repeat fluorescein stain shows minimal corneal abrasion at sit eof foreign body, negative Jacquie's.   I gave ophthalmology referral and erythromycin ointment.    Notified that if he wants to see ophthalmology tonight he will need to go to [facility] ED.  He states he will likely do this. Return precautions given.                          Clinical Impression:   Final diagnoses:  [S05.01XA] Abrasion of right cornea, initial encounter (Primary)  [T15.01XA] Foreign body of right cornea, initial encounter        ED Disposition Condition    Discharge Stable          ED Prescriptions       Medication Sig Dispense Start Date End Date Auth. Provider    erythromycin (ROMYCIN) ophthalmic ointment Place into the right eye 4 (four) times daily. 1 g 8/1/2023 -- Corbin Monte MD          Follow-up Information       Follow up With Specialties Details Why Contact Info Additional Information    [facility] - [address] [place] [address] Ophthalmology In 1 day  [address]  [facility] [address] [phone] Please arrive on the 10th floor for check-in.             Corbin Monte MD  08/04/23 7736

## 2023-08-02 NOTE — TELEPHONE ENCOUNTER
----- Message from Eddie Galdamez MD sent at 8/2/2023  4:26 AM CDT -----  Regarding: ED Follow up  Dear Triage Staff,    Can we please schedule this patient in triage clinic for follow up of corneal foreign body on Wednesday (08/02)? I'd like to see him again to attempt full removal of the rust ring. If possible I would like to schedule this in the afternoon (I would be available after surgery), but if a Monday slot is available my colleague Dr. Wyatt will be available to see him.      Patient's best contact number: [phone]     Thank you!  Dr. Galdamez

## 2023-08-02 NOTE — TELEPHONE ENCOUNTER
Left message stating no insurance/cash pay for ophthalmology is $500. Advised patient to call back to discuss with financial assistance for care credit/payment plan.

## 2023-08-02 NOTE — CONSULTS
"Consultation Report  Ophthalmology Service    Date: 08/02/2023    Reason for Consult: "foreign body overlying R cornea"     History of Present Illness: Eliu Lopez is a 28 y.o. male with no past ocular history who presented to [facility] ED for foreign body in right eye. 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 Reports wearing safety glasses but they were "half on" at the time. Noted some mild irritation to his eye, but did not feel that his vision worsened. Presented to urgent care where he reports a cotton swab was used to clean out the concrete debris. He reports that he next went to [facility] and that they used a tip of a syringe to scrape at his eye. Presents as a referral from [facility] for Ophthalmology consult.     Patient denies any changes in vision, flashes, floaters, or curtain-veil in visual field ou. Has ocular discomfort in right eye    POcularHx: Denies history of ocular problems or past ocular surgeries. Denies wearing glasses or contact lenses    Current eye gtts: Denies     Family Hx: Affirms family history of glaucoma (sister), denies macular degeneration, or blindness. family history is not on file.    PMHx:  has no past medical history on file.     PSurgHx:  has no past surgical history on file.     Home Medications:   Prior to Admission medications    Medication Sig Start Date End Date Taking? Authorizing Provider   erythromycin (ROMYCIN) ophthalmic ointment Place into the right eye 4 (four) times daily. 8/1/23   Corbin Monte MD   lisinopril (PRINIVIL,ZESTRIL) 2.5 MG tablet Take 1 tablet (2.5 mg total) by mouth once daily. for 30 doses 7/19/18 8/18/18  Rigoberto Marina DO   metoprolol succinate (TOPROL-XL) 12.5 MG 24 hr split tablet Take 25 mg by mouth once daily.    Historical Provider   ofloxacin (OCUFLOX) 0.3 % ophthalmic solution Place 1 drop into the right eye 4 (four) times daily. for 7 days " 8/1/23 8/8/23  Maurice Paige PA-C        Medications this encounter:     Allergies: is allergic to penicillins.     Social:  reports that he has been smoking. He has never used smokeless tobacco. He reports current drug use. Drug: Marijuana. He reports that he does not drink alcohol.     ROS: As per HPI    Ocular examination/Dilated fundus examination:  Base Eye Exam       Visual Acuity (Snellen - Linear)         Right Left    Dist sc 20/40 20/50    Dist ph sc 20/25 -1 20/25 -2              Tonometry (Tonopen, 4:03 AM)         Right Left    Pressure 15 8              Pupils         Pupils Dark Light Shape React APD    Right PERRL 3 2 roudn Brisk None    Left PERRL 3 2 roudn Brisk None              Visual Fields         Right Left     Full Full              Extraocular Movement         Right Left     Full, Ortho Full, Ortho              Dilation       Both eyes: 2.5% Phenylephrine, 1% Mydriacyl @ 4:03 AM                  Slit Lamp and Fundus Exam       External Exam         Right Left    External normal normal              Slit Lamp Exam         Right Left    Lids/Lashes Normal Normal    Conjunctiva/Sclera 1+ injection White and quiet    Cornea inferocentral rust ring (0.2 x 0.2 mm) with surrounding epithelial defect noted (0.6 x 1mm). Deeper defect noted centrally within rust ring. siedel negative. Diffuse PEE. Clear    Anterior Chamber Deep and quiet Deep and quiet    Iris Round and reactive Round and reactive    Lens Clear Clear    Anterior Vitreous Normal Normal              Fundus Exam         Right Left    Disc pink,sharp pink, sharp    C/D Ratio .6 .6    Macula Normal Normal    Vessels Normal Normal    Periphery flat w/o holes, tears, detachments 360 flat w/o holes, tears, detachments 360                      Assessment/Plan:     1. Corneal Foreign Body, right eye  - Patient presents with reported concrete dust in right eye around 4pm with irritation afterwards. Had presented to [facility] and reports  that an attempt to remove the object were made.  - va 20/40 // 20/50, IOP 15/18, PERRL, full EOM, full VF.   - rust ring noted, with central epithelial defect within rust ring, likely from prior removal efforts. No other foreign bodies not on cornea. Siedel negative.  - R/B/A to removal of rust ring at slit lamp discussed  - attempted removal of rust ring, however patient with some difficulty remaining still. Majority of the ring remaining in place. Remains siedel negative.  - will follow up in triage clinic for further removal. Will consider use of shoshana.  - recommend erythromycin ointment TID   - recommend artificial tears 4x daily    Patient's Best Contact Number: [phone] (mobile phone number)    Eddie Galdamez MD   [facility]  08/02/2023  4:04 AM

## 2023-08-02 NOTE — ED PROVIDER NOTES
Encounter Date: 8/1/2023       History     Chief Complaint   Patient presents with    Eye Problem     Sent from [facility] for Optho consult. Piece of concrete in R eye since this morning. Reports 6/10 pain when closing eye and slight blurry vision.     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 He went to an urgent care where irrigation and cotton swabs were unsuccessfully utilized for foreign body extraction. At this time he had no flourescein uptake. He then went to [facility] ED, where the same efforts were undertaken, including using the blunt end of bent needle. After this, he had a small amount of flourescein uptake. He has mild eye pain while blinking. He endorses mildly blurry vision in the R eye.         Review of patient's allergies indicates:   Allergen Reactions    Penicillins Rash     History reviewed. No pertinent past medical history.  History reviewed. No pertinent surgical history.  History reviewed. No pertinent family history.  Social History     Tobacco Use    Smoking status: Every Day     Current packs/day: 0.00    Smokeless tobacco: Never   Substance Use Topics    Alcohol use: No    Drug use: Yes     Types: Marijuana     Review of Systems    Physical Exam     Initial Vitals [08/01/23 2255]   BP Pulse Resp Temp SpO2   130/68 (!) 57 14 97.7 °F (36.5 °C) 98 %      MAP       --         Physical Exam    Constitutional: He appears well-developed and well-nourished.   HENT:   Head: Normocephalic.   Eyes: Pupils are equal, round, and reactive to light. Right eye exhibits no chemosis, no discharge, no exudate and no hordeolum. Foreign body present in the right eye. Left eye exhibits no chemosis, no discharge, no exudate and no hordeolum. No foreign body present in the left eye. Right conjunctiva is injected. Left conjunctiva is not injected. No scleral icterus.        Cardiovascular:  Normal rate, regular rhythm, normal heart sounds and intact distal pulses.           Pulmonary/Chest: Breath sounds normal.   Abdominal: Abdomen is soft. Bowel sounds are normal. He exhibits no distension.     Neurological: He is alert and oriented to person, place, and time.   Skin: Skin is warm and dry. Capillary refill takes less than 2 seconds.         ED Course   Procedures  Labs Reviewed - No data to display       Imaging Results    None          Medications   Tdap (BOOSTRIX) vaccine injection 0.5 mL (0.5 mLs Intramuscular Given 8/2/23 0457)     Medical Decision Making:   History:   Old Medical Records: I decided to obtain old medical records.  Old Records Summarized: records from clinic visits.       <> Summary of Records: 8/2 - urgent care - unsuccessful extraction of foreign body  8/2 - [facility] - unsuccessful extraction of foreign body  Initial Assessment:   This is a 28 year old male with a foreign body in his R eye. Irrigation, cotton swab, and blunt edge of needle have all been unsuccessful. Will consult ophthalmology for extraction. Patient does not have up to date tetanus vaccination.  Differential Diagnosis:   Foreign body in eye  Corneal abrasion  ED Management:  Consulted ophthalmology, they will be assisting with extraction of foreign body. Administering Tdap vaccine.            Attending Attestation:   Physician Attestation Statement for Resident:  As the supervising MD   Physician Attestation Statement: I have personally seen and examined this patient.   I agree with the above history.  -:   As the supervising MD I agree with the above PE.     As the supervising MD I agree with the above treatment, course, plan, and disposition.   -: 29 yo M with right eye FB    Ophthalmology consulted, unable to remove the rust ring entirely, to f/u in  the clinic  Plan for erythromycin ophthalmic and clear tears                                Clinical Impression:   Final  diagnoses:  [T15.01XA] Corneal foreign body, right, initial encounter (Primary)        ED Disposition Condition    Discharge Stable          ED Prescriptions       Medication Sig Dispense Start Date End Date Auth. Provider    artificial tears,hypromellose, 0.3 % Drop Apply 2 drops to eye every 4 (four) hours. for 7 days 15 mL 8/2/2023 8/9/2023 Suzy Mancilla MD          Follow-up Information       Follow up With Specialties Details Why Contact Info    [facility] - Emergency Dept Emergency Medicine In 1 week If symptoms worsen [id number] [facility] [id number] [phone]    Your PCP                 Suzy Mancilla MD  08/02/23 0519

## 2023-08-15 ENCOUNTER — TELEPHONE (OUTPATIENT)
Dept: URGENT CARE | Facility: CLINIC | Age: 28
End: 2023-08-15

## 2023-08-15 NOTE — TELEPHONE ENCOUNTER
Called patient regarding getting him scheduled with [facility]. Unable to leave voicemail. There is no authorization on file.

## 2025-02-07 NOTE — ED TRIAGE NOTES
Eliu Lopez, a 28 y.o. male presents to the ED w/ complaint of concrete to right eye. Repots discomfort. Seen at , who referred pt to [facility], who then referred pt to [facility]. Denies blurry vision. +photosensitivity     Triage note:  Chief Complaint   Patient presents with    Eye Problem     Sent from [facility] for Optho consult. Piece of concrete in R eye since this morning. Reports 6/10 pain when closing eye and slight blurry vision.     Review of patient's allergies indicates:   Allergen Reactions    Penicillins Rash     No past medical history on file.     Detail Level: Simple

## 2025-04-29 ENCOUNTER — HOSPITAL ENCOUNTER (EMERGENCY)
Facility: HOSPITAL | Age: 30
Discharge: HOME | End: 2025-04-29
Attending: EMERGENCY MEDICINE

## 2025-04-29 VITALS
RESPIRATION RATE: 18 BRPM | OXYGEN SATURATION: 96 % | DIASTOLIC BLOOD PRESSURE: 65 MMHG | SYSTOLIC BLOOD PRESSURE: 146 MMHG | HEIGHT: 69 IN | HEART RATE: 78 BPM | TEMPERATURE: 98 F | WEIGHT: 179 LBS | BODY MASS INDEX: 26.51 KG/M2

## 2025-04-29 DIAGNOSIS — N20.0 KIDNEY STONE: Primary | ICD-10-CM

## 2025-04-29 DIAGNOSIS — R31.9 URINARY TRACT INFECTION WITH HEMATURIA, SITE UNSPECIFIED: ICD-10-CM

## 2025-04-29 DIAGNOSIS — N50.812 LEFT TESTICULAR PAIN: ICD-10-CM

## 2025-04-29 DIAGNOSIS — N39.0 URINARY TRACT INFECTION WITH HEMATURIA, SITE UNSPECIFIED: ICD-10-CM

## 2025-04-29 LAB
ABSOLUTE EOSINOPHIL (OHS): 0.12 K/UL
ABSOLUTE MONOCYTE (OHS): 0.75 K/UL (ref 0.3–1)
ABSOLUTE NEUTROPHIL COUNT (OHS): 10.47 K/UL (ref 1.8–7.7)
ALBUMIN SERPL BCP-MCNC: 4.6 G/DL (ref 3.5–5.2)
ALP SERPL-CCNC: 87 UNIT/L (ref 40–150)
ALT SERPL W/O P-5'-P-CCNC: 39 UNIT/L (ref 10–44)
ANION GAP (OHS): 11 MMOL/L (ref 8–16)
AST SERPL-CCNC: 28 UNIT/L (ref 11–45)
BACTERIA #/AREA URNS AUTO: ABNORMAL /HPF
BASOPHILS # BLD AUTO: 0.05 K/UL
BASOPHILS NFR BLD AUTO: 0.4 %
BILIRUB SERPL-MCNC: 0.9 MG/DL (ref 0.1–1)
BILIRUB UR QL STRIP.AUTO: NEGATIVE
BUN SERPL-MCNC: 11 MG/DL (ref 6–20)
CALCIUM SERPL-MCNC: 9.8 MG/DL (ref 8.7–10.5)
CHLORIDE SERPL-SCNC: 106 MMOL/L (ref 95–110)
CLARITY UR: ABNORMAL
CO2 SERPL-SCNC: 24 MMOL/L (ref 23–29)
COLOR UR AUTO: YELLOW
CREAT SERPL-MCNC: 0.9 MG/DL (ref 0.5–1.4)
ERYTHROCYTE [DISTWIDTH] IN BLOOD BY AUTOMATED COUNT: 12.8 % (ref 11.5–14.5)
GFR SERPLBLD CREATININE-BSD FMLA CKD-EPI: >60 ML/MIN/1.73/M2
GLUCOSE SERPL-MCNC: 94 MG/DL (ref 70–110)
GLUCOSE UR QL STRIP: NEGATIVE
HCT VFR BLD AUTO: 47.1 % (ref 40–54)
HGB BLD-MCNC: 16.3 GM/DL (ref 14–18)
HGB UR QL STRIP: ABNORMAL
HOLD SPECIMEN: NORMAL
IMM GRANULOCYTES # BLD AUTO: 0.06 K/UL (ref 0–0.04)
IMM GRANULOCYTES NFR BLD AUTO: 0.5 % (ref 0–0.5)
KETONES UR QL STRIP: NEGATIVE
LEUKOCYTE ESTERASE UR QL STRIP: ABNORMAL
LYMPHOCYTES # BLD AUTO: 1.88 K/UL (ref 1–4.8)
MCH RBC QN AUTO: 29.9 PG (ref 27–31)
MCHC RBC AUTO-ENTMCNC: 34.6 G/DL (ref 32–36)
MCV RBC AUTO: 86 FL (ref 82–98)
MICROSCOPIC COMMENT: ABNORMAL
NITRITE UR QL STRIP: NEGATIVE
NUCLEATED RBC (/100WBC) (OHS): 0 /100 WBC
PH UR STRIP: 6 [PH]
PLATELET # BLD AUTO: 233 K/UL (ref 150–450)
PMV BLD AUTO: 9.5 FL (ref 9.2–12.9)
POTASSIUM SERPL-SCNC: 3.9 MMOL/L (ref 3.5–5.1)
PROT SERPL-MCNC: 8.3 GM/DL (ref 6–8.4)
PROT UR QL STRIP: ABNORMAL
RBC # BLD AUTO: 5.46 M/UL (ref 4.6–6.2)
RBC #/AREA URNS AUTO: >100 /HPF (ref 0–4)
RELATIVE EOSINOPHIL (OHS): 0.9 %
RELATIVE LYMPHOCYTE (OHS): 14.1 % (ref 18–48)
RELATIVE MONOCYTE (OHS): 5.6 % (ref 4–15)
RELATIVE NEUTROPHIL (OHS): 78.5 % (ref 38–73)
SODIUM SERPL-SCNC: 141 MMOL/L (ref 136–145)
SP GR UR STRIP: 1.02
UROBILINOGEN UR STRIP-ACNC: NEGATIVE EU/DL
WBC # BLD AUTO: 13.33 K/UL (ref 3.9–12.7)
WBC #/AREA URNS AUTO: 6 /HPF (ref 0–5)

## 2025-04-29 PROCEDURE — 99285 EMERGENCY DEPT VISIT HI MDM: CPT | Mod: 25

## 2025-04-29 PROCEDURE — 81003 URINALYSIS AUTO W/O SCOPE: CPT | Performed by: EMERGENCY MEDICINE

## 2025-04-29 PROCEDURE — 96374 THER/PROPH/DIAG INJ IV PUSH: CPT

## 2025-04-29 PROCEDURE — 85025 COMPLETE CBC W/AUTO DIFF WBC: CPT | Performed by: EMERGENCY MEDICINE

## 2025-04-29 PROCEDURE — 63600175 PHARM REV CODE 636 W HCPCS: Mod: JZ,TB | Performed by: EMERGENCY MEDICINE

## 2025-04-29 PROCEDURE — 82040 ASSAY OF SERUM ALBUMIN: CPT | Performed by: EMERGENCY MEDICINE

## 2025-04-29 RX ORDER — CIPROFLOXACIN 500 MG/1
500 TABLET ORAL 2 TIMES DAILY
Qty: 20 TABLET | Refills: 0 | Status: SHIPPED | OUTPATIENT
Start: 2025-04-29 | End: 2025-05-09

## 2025-04-29 RX ORDER — KETOROLAC TROMETHAMINE 30 MG/ML
15 INJECTION, SOLUTION INTRAMUSCULAR; INTRAVENOUS
Status: COMPLETED | OUTPATIENT
Start: 2025-04-29 | End: 2025-04-29

## 2025-04-29 RX ORDER — HYDROCODONE BITARTRATE AND ACETAMINOPHEN 5; 325 MG/1; MG/1
1 TABLET ORAL EVERY 6 HOURS PRN
Qty: 10 TABLET | Refills: 0 | Status: SHIPPED | OUTPATIENT
Start: 2025-04-29

## 2025-04-29 RX ORDER — ONDANSETRON 4 MG/1
4 TABLET, FILM COATED ORAL EVERY 6 HOURS
Qty: 12 TABLET | Refills: 0 | Status: SHIPPED | OUTPATIENT
Start: 2025-04-29

## 2025-04-29 RX ORDER — TAMSULOSIN HYDROCHLORIDE 0.4 MG/1
0.4 CAPSULE ORAL DAILY
Qty: 7 CAPSULE | Refills: 0 | Status: SHIPPED | OUTPATIENT
Start: 2025-04-29 | End: 2025-05-06

## 2025-04-29 RX ADMIN — KETOROLAC TROMETHAMINE 15 MG: 30 INJECTION, SOLUTION INTRAMUSCULAR; INTRAVENOUS at 06:04

## 2025-04-29 NOTE — ED PROVIDER NOTES
Encounter Date: 4/29/2025       History     Chief Complaint   Patient presents with    Abdominal Pain     C/o LLQ ABD pain radiating to L testicle x2 days. Denies urinary/BM changes. +N/V.      HPI    Patient is a 30-year-old male no reported past medical history that is presenting for left lower quadrant abdominal pain.  As per patient, patient states that he has had left lower quadrant abdominal pain intermittently over the past 2 days.  Patient 1st began to have left lower quadrant abdominal pain 2 days ago, states that it got better later in the day and then returned.  Patient has had mild nausea.  Patient states that pain radiates to left testicle.  Patient denies any fevers, chills, chest pains, shortness of breath, cough.  Patient denies any hematuria, dysuria.  The patient states that he has been urinating without any difficulty.     Review of patient's allergies indicates:   Allergen Reactions    Penicillins Rash     No past medical history on file.  No past surgical history on file.  No family history on file.  Social History[1]  Review of Systems   Constitutional:         No other system positives other than aforementioned as reported by patient       Physical Exam     Initial Vitals [04/29/25 1704]   BP Pulse Resp Temp SpO2   139/87 94 16 97.9 °F (36.6 °C) 98 %      MAP       --         Physical Exam    Vitals reviewed.  Constitutional: He appears well-developed and well-nourished. He is not diaphoretic. No distress.   Well-appearing 30-year-old male, no distress, appropriately conversation   Cardiovascular:  Normal rate, regular rhythm, normal heart sounds and intact distal pulses.     Exam reveals no gallop and no friction rub.       No murmur heard.  Pulmonary/Chest: Breath sounds normal. No respiratory distress. He has no wheezes. He has no rales.   Abdominal: Abdomen is soft. Bowel sounds are normal. He exhibits no distension. There is abdominal tenderness.   Mild tenderness to palpation left groin,  left flank There is no rebound and no guarding.   Musculoskeletal:         General: No tenderness or edema. Normal range of motion.     Neurological: He is alert and oriented to person, place, and time. GCS score is 15.   Skin: Skin is warm and dry. No rash noted. No erythema. No pallor.         ED Course   Procedures  Labs Reviewed   URINALYSIS, REFLEX TO URINE CULTURE - Abnormal       Result Value    Color, UA Yellow      Appearance, UA Hazy (*)     pH, UA 6.0      Spec Grav UA 1.025      Protein, UA Trace (*)     Glucose, UA Negative      Ketones, UA Negative      Bilirubin, UA Negative      Blood, UA 3+ (*)     Nitrites, UA Negative      Urobilinogen, UA Negative      Leukocyte Esterase, UA 1+ (*)    CBC WITH DIFFERENTIAL - Abnormal    WBC 13.33 (*)     RBC 5.46      HGB 16.3      HCT 47.1      MCV 86      MCH 29.9      MCHC 34.6      RDW 12.8      Platelet Count 233      MPV 9.5      Nucleated RBC 0      Neut % 78.5 (*)     Lymph % 14.1 (*)     Mono % 5.6      Eos % 0.9      Basophil % 0.4      Imm Grans % 0.5      Neut # 10.47 (*)     Lymph # 1.88      Mono # 0.75      Eos # 0.12      Baso # 0.05      Imm Grans # 0.06 (*)    URINALYSIS MICROSCOPIC - Abnormal    RBC, UA >100 (*)     WBC, UA 6 (*)     Bacteria, UA Rare      Microscopic Comment       COMPREHENSIVE METABOLIC PANEL - Normal    Sodium 141      Potassium 3.9      Chloride 106      CO2 24      Glucose 94      BUN 11      Creatinine 0.9      Calcium 9.8      Protein Total 8.3      Albumin 4.6      Bilirubin Total 0.9      ALP 87      AST 28      ALT 39      Anion Gap 11      eGFR >60     CBC W/ AUTO DIFFERENTIAL    Narrative:     The following orders were created for panel order CBC auto differential.  Procedure                               Abnormality         Status                     ---------                               -----------         ------                     CBC with Differential[[id number]]        Abnormal            Final result                  Please view results for these tests on the individual orders.   GREY TOP URINE HOLD    Extra Tube Hold for add-ons.            Imaging Results              CT Renal Stone Study ABD Pelvis WO (Final result)  Result time 04/29/25 19:20:36      Final result by Bobby Arellano DO (04/29/25 19:20:36)                   Impression:      Mild left-sided hydronephrosis secondary to a punctate calculus in the mid ureter.      Electronically signed by: Bobby Arellano  Date:    04/29/2025  Time:    19:20               Narrative:    EXAMINATION:  CT RENAL STONE STUDY ABD PELVIS WO    CLINICAL HISTORY:  left lower quadrant pain;    TECHNIQUE:  Multiplanar images were obtained of the abdomen and pelvis from the hemidiaphragms through the symphysis pubis without intravenous contrast.    COMPARISON:  None    FINDINGS:  Lung Bases: Clear.    Heart: Heart size is normal.  No pericardial effusion.    Liver: Normal in size and attenuation without focal hepatic lesion.    Biliary tract: No intrahepatic or extrahepatic biliary ductal dilatation.    Gallbladder: No radiodense gallstone. No wall thickening or pericholecystic fluid.    Pancreas: Normal. No pancreatic ductal dilatation.    Spleen: Normal size without focal lesion.    Adrenals: Normal.    Kidneys and urinary collecting systems: There is a punctate calculus within the left mid ureter, resulting in mild left hydronephrosis.  No additional renal calculi are seen.  There is no right-sided hydronephrosis.    Lymph nodes: None enlarged.    Stomach and bowel: The stomach is normal.  Loops of small and large bowel are normal in caliber without evidence for inflammation or obstruction.  The appendix is normal.    Peritoneum and mesentery: No ascites or free intraperitoneal air. No abdominal fluid collection.    Vasculature: Normal.    Urinary bladder: Normal.    Reproductive organs: The prostate is unremarkable.    Body wall: No abnormality.    Musculoskeletal: No aggressive  osseous lesion.                                       US Scrotum And Testicles (Final result)  Result time 04/29/25 18:19:59      Final result by Aftab Parra MD (04/29/25 18:19:59)                   Impression:      No acute/significant significant sonographic abnormality.      Electronically signed by: Aftab Parra MD  Date:    04/29/2025  Time:    18:19               Narrative:    EXAMINATION:  US SCROTUM AND TESTICLES    CLINICAL HISTORY:  Left testicular pain    TECHNIQUE:  Sonography of the scrotum and testes.    COMPARISON:  None.    FINDINGS:  Right Testicle:    *Size: 4.9 x 2.2 x 3.4 cm  *Appearance: Normal.  *Flow: Normal arterial and venous flow  *Epididymis: Normal.  *Hydrocele: Trace  *Varicocele: None.  .    Left Testicle:    *Size: 4.1 x 1.8 x 3.6 cm  *Appearance: Normal.  *Flow: Normal arterial and venous flow  *Epididymis: Normal in overall size and echogenicity noting a 3 mm simple cyst versus spermatocele at the epididymal head.  *Hydrocele: None.  *Varicocele: None.  .    Other findings: None.                                       Medications   ketorolac injection 15 mg (15 mg Intravenous Given 4/29/25 1840)     Medical Decision Making  1. Differential Diagnosis includes:  Nephrolithiasis, testicular torsion, FLAKITO, UTI      2. Co Morbidities include: n/a   Increased patient risk: n/a      3. External notes reviewed:  Previous ED note      4. History sources independently obtained include: n/a      5. Discussion of management with: n/a      6. Independent intrepretation of tests include: n/a      7. Diagnostic tests or therapies considered but not ordered:  Offered patient hospitalization for concern for nephrolithiasis and UTI however patient at this time we would like to trial outpatient treatment.      8. Social determinants of health: n/a      9. Shared decision making includes:  Patient is a well-appearing 30-year-old male that is presenting for left flank pain.  Workup shows that patient has  left-sided punctate calculus mid ureter left side with mild hydronephrosis.  Patient's urine concern for mild UTI with mild leukocytosis.  Offered patient hospitalization for further evaluation, consider urology consult.  However at this time patient would like to trial outpatient treatment.  Patient understands risks of trialing outpatient treatment which includes but not limited to sepsis, worsening of symptoms, worsening pain, kidney injury.  At this time we will discharge, referral has been placed to urology.  We will start antibiotics on patient.  Discussed at length return precautions which include but not limited to worsening symptoms, fevers, nausea, vomiting, inability to control pain.  Patient agrees with treatment and plan.    Amount and/or Complexity of Data Reviewed  Labs: ordered. Decision-making details documented in ED Course.  Radiology: ordered.    Risk  Prescription drug management.               ED Course as of 04/29/25 2252 Tue Apr 29, 2025 1941 WBC(!): 13.33 [RT]   1941 Urinalysis, Reflex to Urine Culture(!) [RT]   1941 Urinalysis Microscopic(!) [RT]   1941 UA noted.  Offered patient admission and evaluation by Urology, consider possible stenting as patient appears to have possible UTI with stone.  The patient would like to trial outpatient treatment first.  Patient understands risks, patient understands return precautions and understand that he can come here for re-evaluation.  At this time we will start ciprofloxacin, place referral to Urology for further evaluation.  Patient agrees with treatment and plan.  Discussed with the patient, patient understands return precautions which includes but not limited to worsening symptoms. [RT]   1941 Creatinine: 0.9 [RT]      ED Course User Index  [RT] Chip Fernandez MD                           Clinical Impression:  Final diagnoses:  [N50.812] Left testicular pain  [N20.0] Kidney stone (Primary)  [N39.0, R31.9] Urinary tract infection with  hematuria, site unspecified          ED Disposition Condition    Discharge Stable          ED Prescriptions       Medication Sig Dispense Start Date End Date Auth. Provider    ciprofloxacin HCl (CIPRO) 500 MG tablet Take 1 tablet (500 mg total) by mouth 2 (two) times daily. for 10 days 20 tablet 4/29/2025 5/9/2025 Chip Fernandez MD    ondansetron (ZOFRAN) 4 MG tablet Take 1 tablet (4 mg total) by mouth every 6 (six) hours. 12 tablet 4/29/2025 -- Chip Fernandez MD    tamsulosin (FLOMAX) 0.4 mg Cap Take 1 capsule (0.4 mg total) by mouth once daily. for 7 days 7 capsule 4/29/2025 5/6/2025 Chip Fernandez MD    HYDROcodone-acetaminophen (NORCO) 5-325 mg per tablet Take 1 tablet by mouth every 6 (six) hours as needed for Pain. 10 tablet 4/29/2025 -- Chip Fernandez MD          Follow-up Information    None            [1]   Social History  Tobacco Use    Smoking status: Every Day    Smokeless tobacco: Never   Substance Use Topics    Alcohol use: No    Drug use: Yes     Types: Marijuana        Chip Fernandez MD  04/29/25 3209

## 2025-04-30 NOTE — DISCHARGE INSTRUCTIONS
Follow up with your primary care doctor.  Urology referral has been placed.  Return to ED if you develop any new or worsening symptoms such as fevers, nausea, vomiting or unable to control her pain.